# Patient Record
Sex: MALE | Race: WHITE | NOT HISPANIC OR LATINO | Employment: FULL TIME | URBAN - NONMETROPOLITAN AREA
[De-identification: names, ages, dates, MRNs, and addresses within clinical notes are randomized per-mention and may not be internally consistent; named-entity substitution may affect disease eponyms.]

---

## 2017-01-17 ENCOUNTER — OFFICE VISIT (OUTPATIENT)
Dept: PAIN MEDICINE | Facility: CLINIC | Age: 57
End: 2017-01-17

## 2017-01-17 VITALS
SYSTOLIC BLOOD PRESSURE: 130 MMHG | HEIGHT: 68 IN | WEIGHT: 129.9 LBS | BODY MASS INDEX: 19.69 KG/M2 | DIASTOLIC BLOOD PRESSURE: 78 MMHG

## 2017-01-17 DIAGNOSIS — G89.29 CHRONIC LOW BACK PAIN WITH SCIATICA, SCIATICA LATERALITY UNSPECIFIED, UNSPECIFIED BACK PAIN LATERALITY: Primary | ICD-10-CM

## 2017-01-17 DIAGNOSIS — F17.200 SMOKING: ICD-10-CM

## 2017-01-17 DIAGNOSIS — M54.40 CHRONIC LOW BACK PAIN WITH SCIATICA, SCIATICA LATERALITY UNSPECIFIED, UNSPECIFIED BACK PAIN LATERALITY: Primary | ICD-10-CM

## 2017-01-17 DIAGNOSIS — M79.18 MYOFACIAL MUSCLE PAIN: ICD-10-CM

## 2017-01-17 DIAGNOSIS — Z79.891 LONG TERM (CURRENT) USE OF OPIATE ANALGESIC: ICD-10-CM

## 2017-01-17 PROCEDURE — 99204 OFFICE O/P NEW MOD 45 MIN: CPT | Performed by: NURSE PRACTITIONER

## 2017-01-17 RX ORDER — CARISOPRODOL 350 MG/1
350 TABLET ORAL 4 TIMES DAILY PRN
COMMUNITY
End: 2017-03-15 | Stop reason: SDUPTHER

## 2017-01-17 NOTE — MR AVS SNAPSHOT
Nancy Mcclure Sample   2017 8:00 AM   Office Visit    Dept Phone:  [phone]   Encounter #:  [id number]    Provider:  RAHEEM Callahan   Department:  [facility] MEDICAL [facility] PAIN MANAGEMENT                Your Full Care Plan              Your Updated Medication List          This list is accurate as of: 17  9:39 AM.  Always use your most recent med list.                CENTRUM SILVER ADULT 50+ PO       NARCOF PO       SOMA 350 MG tablet   Generic drug:  carisoprodol               We Performed the Following     Ambulatory Referral to Physical Therapy Evaluate and treat (low back pain with bilateral anterior thigh  pain)     XR Spine Lumbar 4+ View       You Were Diagnosed With        Codes Comments    Chronic low back pain with sciatica, sciatica laterality unspecified, unspecified back pain laterality    -  Primary ICD-10-CM: M54.40, G89.29  ICD-9-CM: 724.2, 724.3, 338.29     Myofacial muscle pain     ICD-10-CM: M79.1  ICD-9-CM: 729.1     Long term (current) use of opiate analgesic     ICD-10-CM: Z79.891  ICD-9-CM: V58.69     Smoking     ICD-10-CM: F17.200  ICD-9-CM: 305.1       Instructions     None    Patient Instructions History      Upcoming Appointments     Visit Type Date Time Department    NEW PATIENT 2017  8:00 AM [facility]    FOLLOW UP 3/15/2017  3:00 PM [facility]      go2 [employer] Signup     [facility] [employer] allows you to send messages to your doctor, view your test results, renew your prescriptions, schedule appointments, and more. To sign up, go to [employer] and click on the Sign Up Now link in the New User? box. Enter your QM Power Activation Code exactly as it appears below along with the last four digits of your Social Security Number and your Date of Birth () to complete the sign-up process. If you do not sign up before the expiration date, you must request a new code.    QM Power Activation Code:  [email or web address]  Expires: 1/31/2017  9:39 AM    If you have questions, you can email [email or web address] or call [phone] to talk to our MyChart staff. Remember, [email or web address] is NOT to be used for urgent needs. For medical emergencies, dial 911.               Other Info from Your Visit           Your Appointments     Mar 15, 2017  3:00 PM CDT   Follow Up with Ulysses Mesa MD   [facility] PAIN MANAGEMENT (--)    [address]  [facility] [address] [place] [id number] [phone]           Arrive 15 minutes prior to appointment.              Allergies     No Known Allergies      Reason for Visit     Back Pain lower      Vital Signs     Blood Pressure Height Weight Body Mass Index Smoking Status       130/78 (BP Location: Left arm, Patient Position: Sitting, Cuff Size: Adult) 68\" (172.7 cm) 129 lb 14.4 oz (58.9 kg) 19.75 kg/m2 Heavy Tobacco Smoker       Problems and Diagnoses Noted     Chronic low back pain with sciatica    Admission for long-term opiate analgesic use    Muscle pain    Smoker        "
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
## 2017-01-20 NOTE — PROGRESS NOTES
Addendum:  I have reviewed this patient with RAHEEM Holguin.  I agree with the above findings and plan.  I have personally spoken with the patient today, and confirmed key findings.

## 2017-03-15 ENCOUNTER — APPOINTMENT (OUTPATIENT)
Dept: LAB | Facility: HOSPITAL | Age: 57
End: 2017-03-15

## 2017-03-15 ENCOUNTER — OFFICE VISIT (OUTPATIENT)
Dept: PAIN MEDICINE | Facility: CLINIC | Age: 57
End: 2017-03-15

## 2017-03-15 VITALS
DIASTOLIC BLOOD PRESSURE: 78 MMHG | SYSTOLIC BLOOD PRESSURE: 140 MMHG | HEIGHT: 68 IN | WEIGHT: 131.9 LBS | BODY MASS INDEX: 19.99 KG/M2

## 2017-03-15 DIAGNOSIS — G89.29 CHRONIC BILATERAL LOW BACK PAIN, WITH SCIATICA PRESENCE UNSPECIFIED: Primary | ICD-10-CM

## 2017-03-15 DIAGNOSIS — M79.18 MYOFASCIAL PAIN: ICD-10-CM

## 2017-03-15 DIAGNOSIS — M54.5 CHRONIC BILATERAL LOW BACK PAIN, WITH SCIATICA PRESENCE UNSPECIFIED: Primary | ICD-10-CM

## 2017-03-15 DIAGNOSIS — Z79.899 HIGH RISK MEDICATIONS (NOT ANTICOAGULANTS) LONG-TERM USE: ICD-10-CM

## 2017-03-15 PROCEDURE — 99214 OFFICE O/P EST MOD 30 MIN: CPT | Performed by: PAIN MEDICINE

## 2017-03-15 PROCEDURE — G0481 DRUG TEST DEF 8-14 CLASSES: HCPCS | Performed by: PAIN MEDICINE

## 2017-03-15 PROCEDURE — 80307 DRUG TEST PRSMV CHEM ANLYZR: CPT | Performed by: PAIN MEDICINE

## 2017-03-15 RX ORDER — HYDROCODONE BITARTRATE AND ACETAMINOPHEN 10; 325 MG/1; MG/1
1 TABLET ORAL 4 TIMES DAILY
Qty: 120 TABLET | Refills: 0 | Status: SHIPPED | OUTPATIENT
Start: 2017-03-15 | End: 2017-04-14

## 2017-03-15 RX ORDER — CARISOPRODOL 350 MG/1
350 TABLET ORAL 4 TIMES DAILY
COMMUNITY

## 2017-03-15 RX ORDER — CARISOPRODOL 350 MG/1
350 TABLET ORAL 2 TIMES DAILY
Qty: 60 TABLET | Refills: 1 | Status: SHIPPED | OUTPATIENT
Start: 2017-03-15

## 2017-03-15 RX ORDER — HYDROCODONE BITARTRATE AND ACETAMINOPHEN 10; 325 MG/1; MG/1
TABLET ORAL
COMMUNITY
Start: 2016-03-01

## 2017-03-15 NOTE — PROGRESS NOTES
Nancy Mcclure Sample is a 56 y.o. male.   1960    HPI:   Location: lower back  Quality: aching, throbbing and radiates down both legs  Severity: 3/10  Timing: constant  Alleviating: pain medication  Aggravating: ambulating and increased activity     Patient reports that the opioid medication still provides him good relief and allows increased activity than he would have without the opioid medication. He denies side effects.  X-ray of l-spine showed degen changes.         The following portions of the patient's history were reviewed by me and updated as appropriate: allergies, current medications, past family history, past medical history, past social history, past surgical history and problem list.    Past Medical History   Diagnosis Date   • Corneal FB (foreign body)      improved   • Hearing loss      for 30 years problems hearing high pitche dsounds.   • Measles    • Pneumonia      2016 dec       Social History     Social History   • Marital status:      Spouse name: N/A   • Number of children: N/A   • Years of education: N/A     Occupational History   • Not on file.     Social History Main Topics   • Smoking status: Heavy Tobacco Smoker     Packs/day: 1.00     Types: Cigarettes   • Smokeless tobacco: Not on file      Comment: Smoking Cessation discussed   • Alcohol use Yes      Comment: ocassinally   • Drug use: No   • Sexual activity: Defer     Other Topics Concern   • Not on file     Social History Narrative       Family History   Problem Relation Age of Onset   • Cancer Mother    • Cancer Father          Current Outpatient Prescriptions:   •  carisoprodol (SOMA) 350 MG tablet, Take 1 tablet by mouth 2 (Two) Times a Day., Disp: 60 tablet, Rfl: 1  •  HYDROcodone-acetaminophen (NORCO)  MG per tablet, , Disp: , Rfl:   •  Multiple Vitamins-Minerals (CENTRUM SILVER ADULT 50+ PO), Take  by mouth., Disp: , Rfl:   •  carisoprodol (SOMA) 350 MG tablet, Take 350 mg by mouth 4 (Four) Times a Day., Disp:  , Rfl:   •  HYDROcodone-acetaminophen (NORCO)  MG per tablet, Take 1 tablet by mouth 4 (Four) Times a Day for 30 days., Disp: 120 tablet, Rfl: 0  •  HYDROcodone-acetaminophen (NORCO)  MG per tablet, Take 1 tablet by mouth 4 (Four) Times a Day for 30 days., Disp: 120 tablet, Rfl: 0  •  HYDROcodone-GuaiFENesin (NARCOF PO), Take  by mouth., Disp: , Rfl:   •  VENTOLIN  (90 BASE) MCG/ACT inhaler, , Disp: , Rfl: 0    No Known Allergies      Review of Systems   Musculoskeletal: Positive for back pain (lower).     10 system review of systems was reviewed and negative except for above.    Physical Exam   Constitutional: He appears well-developed and well-nourished. No distress.   Musculoskeletal:        Lumbar back: He exhibits decreased range of motion (60 deg flexion ext 10 deg with facet loading on right).   Neurological: He is alert.   Psychiatric: He has a normal mood and affect. His behavior is normal. Judgment normal.       Nancy was seen today for back pain.    Diagnoses and all orders for this visit:    Chronic bilateral low back pain, with sciatica presence unspecified  -     ToxASSURE Select 13 (MW)    Myofascial pain  -     ToxASSURE Select 13 (MW)    High risk medications (not anticoagulants) long-term use  -     ToxASSURE Select 13 (MW)    Other orders  -     carisoprodol (SOMA) 350 MG tablet; Take 1 tablet by mouth 2 (Two) Times a Day.  -     HYDROcodone-acetaminophen (NORCO)  MG per tablet; Take 1 tablet by mouth 4 (Four) Times a Day for 30 days.  -     HYDROcodone-acetaminophen (NORCO)  MG per tablet; Take 1 tablet by mouth 4 (Four) Times a Day for 30 days.        Medication: Patient reports no negative side effects, Patient reports appropriate usage and storage habits, Patient's opioid provides enough reflief to be more active and perform activities of daily living with less discomfort. and Refill opioid medication as above.  Refill soma as well.    Interventional: none at  this time    Rehab: No call from PT yet. We will try to get this scheduled.    Behavioral: No aberrant behavior noted. SARAH Report #[id number]  was reviewed and is consistent with stated history    Urine drug screen Ordered today to test for drugs of abuse and prescribed medications          This document has been electronically signed by Ulysses Mesa MD on March 15, 2017 3:57 PM

## 2017-03-23 LAB
CONV REPORT SUMMARY: NORMAL
Lab: NORMAL

## 2017-04-10 DIAGNOSIS — G89.29 CHRONIC LOW BACK PAIN WITH SCIATICA, SCIATICA LATERALITY UNSPECIFIED, UNSPECIFIED BACK PAIN LATERALITY: Primary | ICD-10-CM

## 2017-04-10 DIAGNOSIS — M54.40 CHRONIC LOW BACK PAIN WITH SCIATICA, SCIATICA LATERALITY UNSPECIFIED, UNSPECIFIED BACK PAIN LATERALITY: Primary | ICD-10-CM

## 2017-04-19 ENCOUNTER — HOSPITAL ENCOUNTER (OUTPATIENT)
Dept: PHYSICAL THERAPY | Facility: HOSPITAL | Age: 57
Setting detail: THERAPIES SERIES
Discharge: HOME | End: 2017-04-19

## 2017-04-19 DIAGNOSIS — G89.29 CHRONIC LOW BACK PAIN WITH SCIATICA, SCIATICA LATERALITY UNSPECIFIED, UNSPECIFIED BACK PAIN LATERALITY: Primary | ICD-10-CM

## 2017-04-19 DIAGNOSIS — M54.40 CHRONIC LOW BACK PAIN WITH SCIATICA, SCIATICA LATERALITY UNSPECIFIED, UNSPECIFIED BACK PAIN LATERALITY: Primary | ICD-10-CM

## 2017-04-19 PROCEDURE — 97161 PT EVAL LOW COMPLEX 20 MIN: CPT | Performed by: PHYSICAL THERAPIST

## 2017-04-19 PROCEDURE — 97110 THERAPEUTIC EXERCISES: CPT | Performed by: PHYSICAL THERAPIST

## 2017-04-19 NOTE — PROGRESS NOTES
Outpatient Physical Therapy Ortho Initial Evaluation  [facility]     Patient Name: Nancy Mcclure Sample  : 1960  MRN: [id number]  Today's Date: 2017      Visit Date: 2017   Visit 1 of 1  Return to MD: LORETTA  Re-certification date: 5/10/2017  % improvement: N/A     Patient Active Problem List   Diagnosis   • Chronic low back pain with sciatica   • Myofacial muscle pain   • Long term (current) use of opiate analgesic   • Smoking        Past Medical History:   Diagnosis Date   • Corneal FB (foreign body)     improved   • Hearing loss     for 30 years problems hearing high pitche dsounds.   • Measles    • Pneumonia     2016 dec        Past Surgical History:   Procedure Laterality Date   • EYE SURGERY  04/15/2016    remove foreign body cornea with slit lamp   • VASECTOMY             Visit Dx:     ICD-10-CM ICD-9-CM   1. Chronic low back pain with sciatica, sciatica laterality unspecified, unspecified back pain laterality M54.40 724.2    G89.29 724.3     338.29                 PT Ortho       17 1600    Subjective Comments    Subjective Comments 55 yo male with 10 yr h/o chronic low back pain, 2-3 yr h/o bilateral B LE radicular pain described as shooting pain along B posterior thighs traveling distally to the posterior knee region, no h/o trauma or injury to the low back, no falls, patient reports had a lumbar MRI 3 years ago showing evidence of a lumbar disc bulge with nerve root impingement of the S1 nerve root, steadily pt c/o worsening low back pain    -BS    Precautions and Contraindications    Precautions/Limitations no known precautions/limitations  -BS    Subjective Pain    Able to rate subjective pain? yes  -BS    Pre-Treatment Pain Level 3  -BS    Posture/Observations    Posture/Observations Comments kyphotic thoracic spine, absent lumbar lordosis, forward head posture  -BS    Sensation    Additional Comments paresthesia with right anterior lower leg  -BS    Quarter Clearing     Quarter Clearing Lower Quarter Clearing  -BS    Neural Tension Signs- Lower Quarter Clearing    Slump Bilateral:;Negative  -BS    SLR Left:;Positive;Right:;Negative  -BS    Sensory Screen for Light Touch- Lower Quarter Clearing    L1 (inguinal area) Bilateral:;Intact  -BS    L2 (anterior mid thigh) Bilateral:;Intact  -BS    L3 (distal anterior thigh) Bilateral:;Intact  -BS    L4 (medial lower leg/foot) Right:;Diminished  -BS    L5 (lateral lower leg/great toe) Bilateral:;Intact  -BS    Myotomal Screen- Lower Quarter Clearing    Hip flexion (L2) 5 (Normal);Bilateral:  -BS    Knee extension (L3) Right:;4+ (Good +)  -BS    Ankle DF (L4) Bilateral:;5 (Normal)  -BS    Great toe extension (L5) Bilateral:;5 (Normal)  -BS    Ankle PF (S1) Bilateral:;5 (Normal)  -BS    Knee flexion (S2) Bilateral:;5 (Normal)  -BS    ROM (Range of Motion)    General ROM Detail lumbar spine: flex-50% moderate limit, ext- 50%, sidebending R/L-50%  -BS    MMT (Manual Muscle Testing)    General MMT Assessment Detail TTP along L5 spinous process  -BS    Flexibility    Flexibility Tested? Lower Extremity  -BS    Lower Extremity Flexibility    Hamstrings --   R 50 deg, L 55 deg   -BS    Balance Skills Training    Sitting # of Minutes 30 minutes  -BS    Standing Balance # of Minutes 15 minutes  -BS    Gait Assessment/Treatment    Gait, Comment normal gait mechanics with non-antalgic gait   -BS      User Key  (r) = Recorded By, (t) = Taken By, (c) = Cosigned By    Initials Name Provider Type    GENE Walsh, TERRI Physical Therapist                            Therapy Education       04/19/17 1605          Therapy Education    Given HEP;Posture/body mechanics  -BS      Program New  -BS      How Provided Verbal;Written;Demonstration  -BS      Provided to Patient  -BS      Level of Understanding Verbalized;Demonstrated  -BS        User Key  (r) = Recorded By, (t) = Taken By, (c) = Cosigned By    Initials Name Provider Type    GENE Walsh, PT  Physical Therapist                PT OP Goals       04/19/17 1605       PT Short Term Goals    STG Date to Achieve 05/03/17  -BS     STG 1 Patient I with HEP  -BS     STG 1 Progress New  -BS     STG 2 Improve lumbar spine AROM to minimal limit (all planes)  -BS     STG 2 Progress New  -BS     STG 3 Improve R L3 myotome strength (quads) to 5/5 level  -BS     STG 3 Progress New  -BS     STG 4 Improve Modified Oswestry score to < or = 15/50   -BS     Long Term Goals    LTG 1 Improve lumbar spine AROM to WNL (all planes)  -BS     LTG 1 Progress New  -BS     LTG 2 Improve Modified Oswestry score to < or = 10/50   -BS     LTG 3 Improve sitting tolerance to 1 hour with no increase in baseline lumbar spine pain  -BS     LTG 3 Progress New  -BS     Time Calculation    PT Goal Re-Cert Due Date 05/10/17  -BS       User Key  (r) = Recorded By, (t) = Taken By, (c) = Cosigned By    Initials Name Provider Type    GENE Walsh, PT Physical Therapist                      Exercises       04/19/17 1600          Subjective Comments    Subjective Comments 55 yo male with 10 yr h/o chronic low back pain, 2-3 yr h/o bilateral B LE radicular pain described as shooting pain along B posterior thighs traveling distally to the posterior knee region, no h/o trauma or injury to the low back, no falls, patient reports had a lumbar MRI 3 years ago showing evidence of a lumbar disc bulge with nerve root impingement of the S1 nerve root, steadily pt c/o worsening low back pain    -BS      Subjective Pain    Able to rate subjective pain? yes  -BS      Pre-Treatment Pain Level 3  -BS      Aquatics    Aquatics performed? No  -BS      Exercise 1    Exercise Name 1 pelvic tilts  -BS      Sets 1 1  -BS      Reps 1 15  -BS      Exercise 2    Exercise Name 2 Supine piriformis stretch  -BS      Sets 2 1  -BS      Reps 2 3  -BS      Time (Seconds) 2 15  -BS      Exercise 3    Exercise Name 3 supine HS stretch  -BS      Sets 3 1  -BS      Reps 3 3  -BS       Time (Seconds) 3 15  -BS        User Key  (r) = Recorded By, (t) = Taken By, (c) = Cosigned By    Initials Name Provider Type    GENE Walsh, PT Physical Therapist                              Outcome Measures       04/19/17 1800          Modified Oswestry    Modified Oswestry Score/Comments 20/50-40%  -BS      Functional Assessment    Outcome Measure Options Modifed Owestry  -BS        User Key  (r) = Recorded By, (t) = Taken By, (c) = Cosigned By    Initials Name Provider Type    GENE Walsh, PT Physical Therapist            Time Calculation:   Start Time: 1605  Stop Time: 1707  Time Calculation (min): 62 min     Therapy Charges for Today     Code Description Service Date Service Provider Modifiers Qty    93017065102 HC PT THER PROC EA 15 MIN 4/19/2017 Paxton Walsh, PT GP 1    30984756028 HC PT EVAL LOW COMPLEXITY 3 4/19/2017 Paxton Walsh, PT GP 1          PT G-Codes  Outcome Measure Options: Modifed Owestry         Paxton Walsh, PT  4/19/2017

## 2017-04-24 ENCOUNTER — HOSPITAL ENCOUNTER (OUTPATIENT)
Dept: PHYSICAL THERAPY | Facility: HOSPITAL | Age: 57
Setting detail: THERAPIES SERIES
End: 2017-04-24

## 2017-04-27 NOTE — PROGRESS NOTES
Outpatient Physical Therapy Ortho Initial Evaluation  [facility]     Patient Name: Nancy Mcclure Sample  : 1960  MRN: [id number]  Today's Date: 2017      Visit Date: 2017   Visit 1 of 1  Return to MD: LORETTA  Re-certification date: 5/10/17    Patient Active Problem List   Diagnosis   • Chronic low back pain with sciatica   • Myofacial muscle pain   • Long term (current) use of opiate analgesic   • Smoking        Past Medical History:   Diagnosis Date   • Corneal FB (foreign body)     improved   • Hearing loss     for 30 years problems hearing high pitche dsounds.   • Measles    • Pneumonia     2016 dec        Past Surgical History:   Procedure Laterality Date   • EYE SURGERY  04/15/2016    remove foreign body cornea with slit lamp   • VASECTOMY             Visit Dx:     ICD-10-CM ICD-9-CM   1. Chronic low back pain with sciatica, sciatica laterality unspecified, unspecified back pain laterality M54.40 724.2    G89.29 724.3     338.29        Medications (Patient was discharged on 2017)     Outpatient Medications     carisoprodol (SOMA) 350 MG tablet      carisoprodol (SOMA) 350 MG tablet      HYDROcodone-acetaminophen (NORCO)  MG per tablet      HYDROcodone-GuaiFENesin (NARCOF PO)      Multiple Vitamins-Minerals (CENTRUM SILVER ADULT 50+ PO)      VENTOLIN  (90 BASE) MCG/ACT inhaler                       PT Ortho        17 1600          Subjective Comments     Subjective Comments 55 yo male with 10 yr h/o chronic low back pain, 2-3 yr h/o bilateral B LE radicular pain described as shooting pain along B posterior thighs traveling distally to the posterior knee region, no h/o trauma or injury to the low back, no falls, patient reports had a lumbar MRI 3 years ago showing evidence of a lumbar disc bulge with nerve root impingement of the S1 nerve root, steadily pt c/o worsening low back pain  -BS     Precautions and Contraindications     Precautions/Limitations no known  precautions/limitations  -BS     Subjective Pain     Able to rate subjective pain? yes  -BS     Pre-Treatment Pain Level 3  -BS     Posture/Observations     Posture/Observations Comments kyphotic thoracic spine, absent lumbar lordosis, forward head posture  -BS     Sensation     Additional Comments paresthesia with right anterior lower leg  -BS     Quarter Clearing     Quarter Clearing Lower Quarter Clearing  -BS     Neural Tension Signs- Lower Quarter Clearing     Slump Bilateral:;Negative  -BS     SLR Left:;Positive;Right:;Negative  -BS     Sensory Screen for Light Touch- Lower Quarter Clearing     L1 (inguinal area) Bilateral:;Intact  -BS     L2 (anterior mid thigh) Bilateral:;Intact  -BS     L3 (distal anterior thigh) Bilateral:;Intact  -BS     L4 (medial lower leg/foot) Right:;Diminished  -BS     L5 (lateral lower leg/great toe) Bilateral:;Intact  -BS     Myotomal Screen- Lower Quarter Clearing     Hip flexion (L2) 5 (Normal);Bilateral:  -BS     Knee extension (L3) Right:;4+ (Good +)  -BS     Ankle DF (L4) Bilateral:;5 (Normal)  -BS     Great toe extension (L5) Bilateral:;5 (Normal)  -BS     Ankle PF (S1) Bilateral:;5 (Normal)  -BS     Knee flexion (S2) Bilateral:;5 (Normal)  -BS     ROM (Range of Motion)     General ROM Detail lumbar spine: flex-50% moderate limit, ext- 50%, sidebending R/L-50%  -BS     MMT (Manual Muscle Testing)     General MMT Assessment Detail TTP along L5 spinous process  -BS     Flexibility     Flexibility Tested? Lower Extremity  -BS     Lower Extremity Flexibility     Hamstrings --   R 50 deg, L 55 deg   -BS     Balance Skills Training     Sitting # of Minutes 30 minutes  -BS     Standing Balance # of Minutes 15 minutes  -BS     Gait Assessment/Treatment     Gait, Comment normal gait mechanics with non-antalgic gait  -BS          User Key  (r) = Recorded By, (t) = Taken By, (c) = Cosigned By    Initials Name Provider Type     GENE Walsh, PT Physical Therapist                                       Therapy Education        04/19/17 1605                Therapy Education     Given HEP;Posture/body mechanics  -BS         Program New  -BS         How Provided Verbal;Written;Demonstration  -BS         Provided to Patient  -BS         Level of Understanding Verbalized;Demonstrated  -BS              User Key  (r) = Recorded By, (t) = Taken By, (c) = Cosigned By    Initials Name Provider Type     GENE Walsh, PT Physical Therapist                    PT OP Goals        04/19/17 1605             PT Short Term Goals     STG Date to Achieve 05/03/17  -BS       STG 1 Patient I with HEP  -BS       STG 1 Progress New  -BS       STG 2 Improve lumbar spine AROM to minimal limit (all planes)  -BS       STG 2 Progress New  -BS       STG 3 Improve R L3 myotome strength (quads) to 5/5 level  -BS       STG 3 Progress New  -BS       STG 4 Improve Modified Oswestry score to < or = 15/50  -BS       Long Term Goals     LTG 1 Improve lumbar spine AROM to WNL (all planes)  -BS       LTG 1 Progress New  -BS       LTG 2 Improve Modified Oswestry score to < or = 10/50  -BS       LTG 3 Improve sitting tolerance to 1 hour with no increase in baseline lumbar spine pain  -BS       LTG 3 Progress New  -BS       Time Calculation     PT Goal Re-Cert Due Date 05/10/17  -BS            User Key  (r) = Recorded By, (t) = Taken By, (c) = Cosigned By    Initials Name Provider Type     GENE Walsh, PT Physical Therapist                             Exercises        04/19/17 1600                Subjective Comments     Subjective Comments 57 yo male with 10 yr h/o chronic low back pain, 2-3 yr h/o bilateral B LE radicular pain described as shooting pain along B posterior thighs traveling distally to the posterior knee region, no h/o trauma or injury to the low back, no falls, patient reports had a lumbar MRI 3 years ago showing evidence of a lumbar disc bulge with nerve root impingement of the S1 nerve root,  steadily pt c/o worsening low back pain  -BS         Subjective Pain     Able to rate subjective pain? yes  -BS         Pre-Treatment Pain Level 3  -BS         Aquatics     Aquatics performed? No  -BS         Exercise 1     Exercise Name 1 pelvic tilts  -BS         Sets 1 1  -BS         Reps 1 15  -BS         Exercise 2     Exercise Name 2 Supine piriformis stretch  -BS         Sets 2 1  -BS         Reps 2 3  -BS         Time (Seconds) 2 15  -BS         Exercise 3     Exercise Name 3 supine HS stretch  -BS         Sets 3 1  -BS         Reps 3 3  -BS         Time (Seconds) 3 15  -BS              User Key  (r) = Recorded By, (t) = Taken By, (c) = Cosigned By    Initials Name Provider Type     BS Paxton Walsh, PT Physical Therapist                                      Outcome Measures        04/19/17 1800                Modified Oswestry     Modified Oswestry Score/Comments 20/50-40%  -BS         Functional Assessment     Outcome Measure Options Modifed Owestry  -BS              User Key  (r) = Recorded By, (t) = Taken By, (c) = Cosigned By    Initials Name Provider Type     BS Paxton Walsh, PT Physical Therapist               Time Calculation:   Start Time: 1605  Stop Time: 1707  Time Calculation (min): 62 min      Therapy Charges for Today     Code Description Service Date Service Provider Modifiers Qty     20642442061 HC PT THER PROC EA 15 MIN 4/19/2017 Paxton Walsh, PT GP 1     27581600440 HC PT EVAL LOW COMPLEXITY 3 4/19/2017 Paxton Walsh, PT GP 1                    PT Ortho        04/19/17 1600          Subjective Comments     Subjective Comments 57 yo male with 10 yr h/o chronic low back pain, 2-3 yr h/o bilateral B LE radicular pain described as shooting pain along B posterior thighs traveling distally to the posterior knee region, no h/o trauma or injury to the low back, no falls, patient reports had a lumbar MRI 3 years ago showing evidence of a lumbar disc bulge with nerve root impingement of the S1  nerve root, steadily pt c/o worsening low back pain  -BS     Precautions and Contraindications     Precautions/Limitations no known precautions/limitations  -BS     Subjective Pain     Able to rate subjective pain? yes  -BS     Pre-Treatment Pain Level 3  -BS     Posture/Observations     Posture/Observations Comments kyphotic thoracic spine, absent lumbar lordosis, forward head posture  -BS     Sensation     Additional Comments paresthesia with right anterior lower leg  -BS     Quarter Clearing     Quarter Clearing Lower Quarter Clearing  -BS     Neural Tension Signs- Lower Quarter Clearing     Slump Bilateral:;Negative  -BS     SLR Left:;Positive;Right:;Negative  -BS     Sensory Screen for Light Touch- Lower Quarter Clearing     L1 (inguinal area) Bilateral:;Intact  -BS     L2 (anterior mid thigh) Bilateral:;Intact  -BS     L3 (distal anterior thigh) Bilateral:;Intact  -BS     L4 (medial lower leg/foot) Right:;Diminished  -BS     L5 (lateral lower leg/great toe) Bilateral:;Intact  -BS     Myotomal Screen- Lower Quarter Clearing     Hip flexion (L2) 5 (Normal);Bilateral:  -BS     Knee extension (L3) Right:;4+ (Good +)  -BS     Ankle DF (L4) Bilateral:;5 (Normal)  -BS     Great toe extension (L5) Bilateral:;5 (Normal)  -BS     Ankle PF (S1) Bilateral:;5 (Normal)  -BS     Knee flexion (S2) Bilateral:;5 (Normal)  -BS     ROM (Range of Motion)     General ROM Detail lumbar spine: flex-50% moderate limit, ext- 50%, sidebending R/L-50%  -BS     MMT (Manual Muscle Testing)     General MMT Assessment Detail TTP along L5 spinous process  -BS     Flexibility     Flexibility Tested? Lower Extremity  -BS     Lower Extremity Flexibility     Hamstrings --   R 50 deg, L 55 deg   -BS     Balance Skills Training     Sitting # of Minutes 30 minutes  -BS     Standing Balance # of Minutes 15 minutes  -BS     Gait Assessment/Treatment     Gait, Comment normal gait mechanics with non-antalgic gait  -BS          User Key  (r) = Recorded  By, (t) = Taken By, (c) = Cosigned By    Initials Name Provider Type     GENE Walsh, PT Physical Therapist                                      Therapy Education        04/19/17 1605                Therapy Education     Given HEP;Posture/body mechanics  -BS         Program New  -BS         How Provided Verbal;Written;Demonstration  -BS         Provided to Patient  -BS         Level of Understanding Verbalized;Demonstrated  -BS              User Key  (r) = Recorded By, (t) = Taken By, (c) = Cosigned By    Initials Name Provider Type     GENE Waslh, PT Physical Therapist                    PT OP Goals        04/19/17 1605             PT Short Term Goals     STG Date to Achieve 05/03/17  -BS       STG 1 Patient I with HEP  -BS       STG 1 Progress New  -BS       STG 2 Improve lumbar spine AROM to minimal limit (all planes)  -BS       STG 2 Progress New  -BS       STG 3 Improve R L3 myotome strength (quads) to 5/5 level  -BS       STG 3 Progress New  -BS       STG 4 Improve Modified Oswestry score to < or = 15/50  -BS       Long Term Goals     LTG 1 Improve lumbar spine AROM to WNL (all planes)  -BS       LTG 1 Progress New  -BS       LTG 2 Improve Modified Oswestry score to < or = 10/50  -BS       LTG 3 Improve sitting tolerance to 1 hour with no increase in baseline lumbar spine pain  -BS       LTG 3 Progress New  -BS       Time Calculation     PT Goal Re-Cert Due Date 05/10/17  -BS            User Key  (r) = Recorded By, (t) = Taken By, (c) = Cosigned By    Initials Name Provider Type     GENE Walsh, PT Physical Therapist                             Exercises        04/19/17 1600                Subjective Comments     Subjective Comments 55 yo male with 10 yr h/o chronic low back pain, 2-3 yr h/o bilateral B LE radicular pain described as shooting pain along B posterior thighs traveling distally to the posterior knee region, no h/o trauma or injury to the low back, no falls, patient reports had a  lumbar MRI 3 years ago showing evidence of a lumbar disc bulge with nerve root impingement of the S1 nerve root, steadily pt c/o worsening low back pain  -BS         Subjective Pain     Able to rate subjective pain? yes  -BS         Pre-Treatment Pain Level 3  -BS         Aquatics     Aquatics performed? No  -BS         Exercise 1     Exercise Name 1 pelvic tilts  -BS         Sets 1 1  -BS         Reps 1 15  -BS         Exercise 2     Exercise Name 2 Supine piriformis stretch  -BS         Sets 2 1  -BS         Reps 2 3  -BS         Time (Seconds) 2 15  -BS         Exercise 3     Exercise Name 3 supine HS stretch  -BS         Sets 3 1  -BS         Reps 3 3  -BS         Time (Seconds) 3 15  -BS              User Key  (r) = Recorded By, (t) = Taken By, (c) = Cosigned By    Initials Name Provider Type     GENE Walsh, PT Physical Therapist                                      Outcome Measures        04/19/17 1800                Modified Oswestry     Modified Oswestry Score/Comments 20/50-40%  -BS         Functional Assessment     Outcome Measure Options Modifed Owestry  -BS              User Key  (r) = Recorded By, (t) = Taken By, (c) = Cosigned By    Initials Name Provider Type     GENE Walsh, PT Physical Therapist               Time Calculation:   Start Time: 1605  Stop Time: 1707  Time Calculation (min): 62 min      Therapy Charges for Today     Code Description Service Date Service Provider Modifiers Qty     83897517174 HC PT THER PROC EA 15 MIN 4/19/2017 Paxton Walsh, PT GP 1     85545945587 HC PT EVAL LOW COMPLEXITY 3 4/19/2017 Paxton Walsh, PT GP 1            PT G-Codes  Outcome Measure Options: Modifed Owestry            PT G-Codes  Outcome Measure Options: Modifed Owestry       addendum: note amended on 4/27/17 as a late entry due to omission of assessment and plan when entered originally on 4/19/17. Sent to MD on 4/27/17.                              PT Assessment/Plan       04/27/17 1700       PT  Assessment    Functional Limitations Performance in self-care ADL;Performance in leisure activities  -BS     Impairments Range of motion;Pain;Muscle strength;Poor body mechanics;Posture;Sensation  -BS     Assessment Comments 57 yo male with chronic LBP with evidence of L LE radiculopathy. Presents limited by decreased lumbar ROM, decreased L quad strength, low back pain and adverse neural tissue tension in the L LE.    -BS     Please refer to paper survey for additional self-reported information Yes  -BS     Rehab Potential Good  -BS     Patient/caregiver participated in establishment of treatment plan and goals Yes  -BS     Patient would benefit from skilled therapy intervention Yes  -BS     PT Plan    PT Frequency 2x/week  -BS     Predicted Duration of Therapy Intervention (days/wks) 6 weeks  -BS     Planned CPT's? PT EVAL LOW COMPLEXITY: 49949;PT THER PROC EA 15 MIN: 62974;PT RE-EVAL: 89283;PT MANUAL THERAPY EA 15 MIN: 21781;PT ELECTRICAL STIM UNATTEND: ;PT HOT/COLD PACK WC NONMCARE: 36535;PT THER SUPP EA 15 MIN  -BS     Physical Therapy Interventions (Optional Details) home exercise program;joint mobilization;lumbar stabilization;manual therapy techniques;modalities;patient/family education;postural re-education;ROM (Range of Motion);strengthening;stretching  -BS     PT Plan Comments f/u with core trunk stabilization  -BS       User Key  (r) = Recorded By, (t) = Taken By, (c) = Cosigned By    Initials Name Provider Type    GENE Walsh, PT Physical Therapist                                    Time Calculation:   Start Time: 1605  Stop Time: 1707  Time Calculation (min): 62 min         PT G-Demetra  Outcome Measure Options: Modifed Osman Walsh, PT  4/27/2017

## 2017-05-09 ENCOUNTER — HOSPITAL ENCOUNTER (OUTPATIENT)
Dept: PHYSICAL THERAPY | Facility: HOSPITAL | Age: 57
Setting detail: THERAPIES SERIES
Discharge: HOME | End: 2017-05-09

## 2017-05-09 DIAGNOSIS — M54.40 CHRONIC LOW BACK PAIN WITH SCIATICA, SCIATICA LATERALITY UNSPECIFIED, UNSPECIFIED BACK PAIN LATERALITY: Primary | ICD-10-CM

## 2017-05-09 DIAGNOSIS — G89.29 CHRONIC LOW BACK PAIN WITH SCIATICA, SCIATICA LATERALITY UNSPECIFIED, UNSPECIFIED BACK PAIN LATERALITY: Primary | ICD-10-CM

## 2017-05-09 PROCEDURE — 97110 THERAPEUTIC EXERCISES: CPT | Performed by: PHYSICAL THERAPIST

## 2017-05-16 ENCOUNTER — OFFICE VISIT (OUTPATIENT)
Dept: PAIN MEDICINE | Facility: CLINIC | Age: 57
End: 2017-05-16

## 2017-05-16 VITALS
SYSTOLIC BLOOD PRESSURE: 138 MMHG | BODY MASS INDEX: 19.76 KG/M2 | DIASTOLIC BLOOD PRESSURE: 70 MMHG | HEIGHT: 68 IN | WEIGHT: 130.4 LBS

## 2017-05-16 DIAGNOSIS — Z79.899 HIGH RISK MEDICATIONS (NOT ANTICOAGULANTS) LONG-TERM USE: ICD-10-CM

## 2017-05-16 DIAGNOSIS — G89.29 CHRONIC BILATERAL LOW BACK PAIN WITH SCIATICA, SCIATICA LATERALITY UNSPECIFIED: Primary | ICD-10-CM

## 2017-05-16 DIAGNOSIS — M79.18 MYOFACIAL MUSCLE PAIN: ICD-10-CM

## 2017-05-16 DIAGNOSIS — M54.40 CHRONIC BILATERAL LOW BACK PAIN WITH SCIATICA, SCIATICA LATERALITY UNSPECIFIED: Primary | ICD-10-CM

## 2017-05-16 PROCEDURE — 99213 OFFICE O/P EST LOW 20 MIN: CPT | Performed by: NURSE PRACTITIONER

## 2017-05-16 RX ORDER — CARISOPRODOL 350 MG/1
350 TABLET ORAL 4 TIMES DAILY
COMMUNITY

## 2017-05-16 RX ORDER — ALBUTEROL SULFATE 90 UG/1
2 AEROSOL, METERED RESPIRATORY (INHALATION) EVERY 6 HOURS
COMMUNITY
Start: 2016-12-08 | End: 2017-12-09

## 2017-05-16 RX ORDER — HYDROCODONE BITARTRATE AND ACETAMINOPHEN 10; 325 MG/1; MG/1
TABLET ORAL
COMMUNITY
Start: 2016-03-01

## 2017-05-16 RX ORDER — GUAIFENESIN, PSEUDOEPHEDRINE HYDROCHLORIDE 600; 60 MG/1; MG/1
TABLET, EXTENDED RELEASE ORAL
Refills: 0 | COMMUNITY
Start: 2017-02-15

## 2017-05-18 ENCOUNTER — HOSPITAL ENCOUNTER (OUTPATIENT)
Dept: PHYSICAL THERAPY | Facility: HOSPITAL | Age: 57
Setting detail: THERAPIES SERIES
Discharge: HOME | End: 2017-05-18

## 2017-05-18 DIAGNOSIS — G89.29 CHRONIC LOW BACK PAIN WITH SCIATICA, SCIATICA LATERALITY UNSPECIFIED, UNSPECIFIED BACK PAIN LATERALITY: Primary | ICD-10-CM

## 2017-05-18 DIAGNOSIS — M54.40 CHRONIC LOW BACK PAIN WITH SCIATICA, SCIATICA LATERALITY UNSPECIFIED, UNSPECIFIED BACK PAIN LATERALITY: Primary | ICD-10-CM

## 2017-05-18 PROCEDURE — 97110 THERAPEUTIC EXERCISES: CPT | Performed by: PHYSICAL THERAPIST

## 2017-06-12 ENCOUNTER — HOSPITAL ENCOUNTER (OUTPATIENT)
Dept: PHYSICAL THERAPY | Facility: HOSPITAL | Age: 57
Setting detail: THERAPIES SERIES
End: 2017-06-12

## 2017-06-12 DIAGNOSIS — G89.29 CHRONIC MIDLINE LOW BACK PAIN, WITH SCIATICA PRESENCE UNSPECIFIED: Primary | ICD-10-CM

## 2017-06-12 DIAGNOSIS — M54.5 CHRONIC MIDLINE LOW BACK PAIN, WITH SCIATICA PRESENCE UNSPECIFIED: Primary | ICD-10-CM

## 2017-06-20 ENCOUNTER — HOSPITAL ENCOUNTER (OUTPATIENT)
Dept: PHYSICAL THERAPY | Facility: HOSPITAL | Age: 57
Setting detail: THERAPIES SERIES
Discharge: HOME | End: 2017-06-20

## 2017-06-20 DIAGNOSIS — G89.29 CHRONIC LOW BACK PAIN WITH SCIATICA, SCIATICA LATERALITY UNSPECIFIED, UNSPECIFIED BACK PAIN LATERALITY: Primary | ICD-10-CM

## 2017-06-20 DIAGNOSIS — M54.40 CHRONIC LOW BACK PAIN WITH SCIATICA, SCIATICA LATERALITY UNSPECIFIED, UNSPECIFIED BACK PAIN LATERALITY: Primary | ICD-10-CM

## 2017-06-20 PROCEDURE — 97110 THERAPEUTIC EXERCISES: CPT | Performed by: PHYSICAL THERAPIST

## 2017-06-20 NOTE — THERAPY PROGRESS REPORT/RE-CERT
Outpatient Physical Therapy Ortho Progress Note   [place]     Patient Name: Nancy Mcclure Sample  : 1960  MRN: [id number]  Today's Date: 2017      Visit Date: 2017  Visit /  Re-cert date: 17  Return to MD: ??    Patient Active Problem List   Diagnosis   • Chronic low back pain with sciatica   • Myofacial muscle pain   • Long term (current) use of opiate analgesic   • Smoking   • High risk medications (not anticoagulants) long-term use        Past Medical History:   Diagnosis Date   • Corneal FB (foreign body)     improved   • Hearing loss     for 30 years problems hearing high pitche dsounds.   • Measles    • Pneumonia     2016 dec        Past Surgical History:   Procedure Laterality Date   • EYE SURGERY  04/15/2016    remove foreign body cornea with slit lamp   • VASECTOMY             Visit Dx:     ICD-10-CM ICD-9-CM   1. Chronic low back pain with sciatica, sciatica laterality unspecified, unspecified back pain laterality M54.40 724.2    G89.29 724.3     338.29                 PT Ortho       17 1600    Subjective Comments    Subjective Comments pt reports not able to attend therapy for the past month due to work conflicts, pt states the clinic cancelled his last session (Sports medicine)  -BS    Precautions and Contraindications    Precautions/Limitations no known precautions/limitations  -BS    Subjective Pain    Able to rate subjective pain? yes  -BS    Pre-Treatment Pain Level 3  -BS    Sensation    Additional Comments TTP along L5 spinous process and right transverse process  -BS    Quarter Clearing    Quarter Clearing Lower Quarter Clearing  -BS    Neural Tension Signs- Lower Quarter Clearing    SLR Right:;Positive;Left:;Negative  -BS    Sensory Screen for Light Touch- Lower Quarter Clearing    L1 (inguinal area) Bilateral:;Intact  -BS    L2 (anterior mid thigh) Bilateral:;Intact  -BS    L3 (distal anterior thigh) Bilateral:;Intact  -BS    L4 (medial lower  leg/foot) Bilateral:;Intact  -BS    L5 (lateral lower leg/great toe) Right:;Diminished;Left:;Intact  -BS    S1 (bottom of foot) Bilateral:;Intact  -BS    Myotomal Screen- Lower Quarter Clearing    Hip flexion (L2) Bilateral:;5 (Normal)  -BS    Knee extension (L3) Bilateral:;4 (Good)  -BS    Ankle DF (L4) Bilateral:;5 (Normal)  -BS    Great toe extension (L5) Bilateral:;5 (Normal)  -BS    Ankle PF (S1) Bilateral:;5 (Normal)  -BS    Knee flexion (S2) Bilateral:;5 (Normal)  -BS    Lumbar ROM Screen- Lower Quarter Clearing    Lumbar Flexion Impaired   minimal limit 75%  -BS    Lumbar Extension Impaired   moderate limit 50%  -BS    Lumbar Lateral Flexion Normal   bilaterally  -BS    Lumbar Rotation Impaired   severe limit R 25%, minimal limit 75% left  -BS      User Key  (r) = Recorded By, (t) = Taken By, (c) = Cosigned By    Initials Name Provider Type    GENE Walsh PT Physical Therapist                            Therapy Education       06/20/17 1600          Therapy Education    Given HEP  -BS      Program Reinforced  -BS      How Provided Verbal  -BS      Provided to Patient  -BS      Level of Understanding Verbalized;Demonstrated  -BS        User Key  (r) = Recorded By, (t) = Taken By, (c) = Cosigned By    Initials Name Provider Type    GENE Walsh PT Physical Therapist                PT OP Goals       06/20/17 1600       PT Short Term Goals    STG Date to Achieve 07/04/17  -BS     STG 1 Patient I with HEP  -BS     STG 1 Progress Ongoing  -BS     STG 2 Improve lumbar spine AROM to minimal limit (all planes)  -BS     STG 2 Progress Progressing  -BS     STG 3 Improve R L3 myotome strength (quads) to 5/5 level  -BS     STG 3 Progress Ongoing  -BS     STG 4 Improve Modified Oswestry score to < or = 15/50   -BS     STG 4 Progress Ongoing  -BS     Long Term Goals    LTG Date to Achieve 07/18/17  -BS     LTG 1 Improve lumbar spine AROM to WNL (all planes)  -BS     LTG 1 Progress Ongoing  -BS     LTG 2 Improve  Modified Oswestry score to < or = 10/50   -BS     LTG 2 Progress Ongoing  -BS     LTG 3 Improve sitting tolerance to 1 hour with no increase in baseline lumbar spine pain  -BS     LTG 3 Progress Ongoing  -BS     Time Calculation    PT Goal Re-Cert Due Date 07/11/17  -BS       User Key  (r) = Recorded By, (t) = Taken By, (c) = Cosigned By    Initials Name Provider Type    BS Paxton Walsh, PT Physical Therapist                PT Assessment/Plan       06/20/17 1700       PT Assessment    Functional Limitations Performance in self-care ADL;Performance in leisure activities  -BS     Impairments Range of motion;Pain;Muscle strength;Poor body mechanics;Posture;Sensation  -BS     Assessment Comments pt limited by core trunk instability and evidence of slight R LE radiculopathy upon reassessment today (+ SLR on R only). Pt instructed to commit to cominig to PT at least once a week to get any positive outcomes from PT  -BS     Please refer to paper survey for additional self-reported information Yes  -BS     Rehab Potential Good  -BS     Patient/caregiver participated in establishment of treatment plan and goals Yes  -BS     Patient would benefit from skilled therapy intervention Yes  -BS     PT Plan    PT Frequency 1x/week  -BS     Predicted Duration of Therapy Intervention (days/wks) 4 weeks  -BS     Planned CPT's? PT RE-EVAL: 21059;PT THER PROC EA 15 MIN: 15310;PT THER ACT EA 15 MIN: 49194;PT MANUAL THERAPY EA 15 MIN: 01788;PT ELECTRICAL STIM UNATTEND: ;PT ULTRASOUND EA 15 MIN: 78224;PT TRACTION LUMBAR: 53324;PT NEUROMUSC RE-EDUCATION EA 15 MIN: 16089;PT HOT/COLD PACK WC NONMCARE: 91691;PT THER SUPP EA 15 MIN  -BS     Physical Therapy Interventions (Optional Details) home exercise program;joint mobilization;lumbar stabilization;manual therapy techniques;modalities;neuromuscular re-education;patient/family education;postural re-education;ROM (Range of Motion);strengthening;stretching  -BS     PT Plan Comments focus on  "core trunk stability, revisit effect of extension based lumbar ROM ex's with follow up session  -BS       User Key  (r) = Recorded By, (t) = Taken By, (c) = Cosigned By    Initials Name Provider Type    GENE Walsh PT Physical Therapist                  Exercises       06/20/17 1600          Subjective Comments    Subjective Comments pt reports not able to attend therapy for the past month due to work conflicts, pt states the clinic cancelled his last session (Sports medicine)  -BS      Subjective Pain    Able to rate subjective pain? yes  -BS      Pre-Treatment Pain Level 3  -BS      Post-Treatment Pain Level 4  -BS      Aquatics    Aquatics performed? No  -BS      Exercise 1    Exercise Name 1 prone lying   -BS      Exercise 2    Exercise Name 2 prone on elbows   -BS      Exercise 3    Exercise Name 3 supine HS stretch  -BS      Sets 3 1  -BS      Reps 3 2  -BS      Time (Seconds) 3 30\" hold  -BS      Exercise 4    Exercise Name 4 B SLR  -BS      Sets 4 1  -BS      Reps 4 10  -BS      Exercise 5    Exercise Name 5 bridge with B iso hip add  -BS      Sets 5 1  -BS      Reps 5 10  -BS        User Key  (r) = Recorded By, (t) = Taken By, (c) = Cosigned By    Initials Name Provider Type    GENE Walsh PT Physical Therapist                              Outcome Measures       06/20/17 1600          Modified Oswestry    Modified Oswestry Score/Comments 22/50-44%  -BS      Functional Assessment    Outcome Measure Options Modifed Owestry  -BS        User Key  (r) = Recorded By, (t) = Taken By, (c) = Cosigned By    Initials Name Provider Type    GENE Walsh PT Physical Therapist            Time Calculation:   Start Time: 1617  Stop Time: 1656  Time Calculation (min): 39 min     Therapy Charges for Today     Code Description Service Date Service Provider Modifiers Qty    19185265929  PT THER PROC EA 15 MIN 6/20/2017 Paxton Walsh, PT GP 3          PT G-Codes  Outcome Measure Options: Modifed Owestry "         Paxton Walsh, PT  6/20/2017

## 2017-06-28 ENCOUNTER — HOSPITAL ENCOUNTER (OUTPATIENT)
Dept: PHYSICAL THERAPY | Facility: HOSPITAL | Age: 57
Setting detail: THERAPIES SERIES
Discharge: HOME | End: 2017-06-28

## 2017-06-28 DIAGNOSIS — M54.40 CHRONIC LOW BACK PAIN WITH SCIATICA, SCIATICA LATERALITY UNSPECIFIED, UNSPECIFIED BACK PAIN LATERALITY: Primary | ICD-10-CM

## 2017-06-28 DIAGNOSIS — G89.29 CHRONIC LOW BACK PAIN WITH SCIATICA, SCIATICA LATERALITY UNSPECIFIED, UNSPECIFIED BACK PAIN LATERALITY: Primary | ICD-10-CM

## 2017-06-28 PROCEDURE — 97110 THERAPEUTIC EXERCISES: CPT | Performed by: PHYSICAL THERAPIST

## 2017-06-28 PROCEDURE — 97140 MANUAL THERAPY 1/> REGIONS: CPT | Performed by: PHYSICAL THERAPIST

## 2017-06-28 NOTE — THERAPY TREATMENT NOTE
Outpatient Physical Therapy Ortho Treatment Note  [facility]     Patient Name: Nancy Mcclure Sample  : 1960  MRN: [id number]  Today's Date: 2017      Visit Date: 2017        Visit   Recert date 17  MD visit 17  'little' improvement            Visit Dx:    ICD-10-CM ICD-9-CM   1. Chronic low back pain with sciatica, sciatica laterality unspecified, unspecified back pain laterality M54.40 724.2    G89.29 724.3     338.29       Patient Active Problem List   Diagnosis   • Chronic low back pain with sciatica   • Myofacial muscle pain   • Long term (current) use of opiate analgesic   • Smoking   • High risk medications (not anticoagulants) long-term use        Past Medical History:   Diagnosis Date   • Corneal FB (foreign body)     improved   • Hearing loss     for 30 years problems hearing high pitche dsounds.   • Measles    • Pneumonia     2016 dec        Past Surgical History:   Procedure Laterality Date   • EYE SURGERY  04/15/2016    remove foreign body cornea with slit lamp   • VASECTOMY                                   PT Assessment/Plan       17 17017 1644    PT Assessment    Assessment Comments difficulty w/ sit to stands  -KW point tenderness to right sided lumbar spine in paraspinals  -KW      17 1614       PT Assessment    Assessment Comments + SLR on R LE  -KW     PT Plan    PT Plan Comments focus on more core trunk stability, try more extention exercises  -KW       User Key  (r) = Recorded By, (t) = Taken By, (c) = Cosigned By    Initials Name Provider Type    USMAN Cassidy, PT Physical Therapist                Modalities       17 1600          Ice    Ice Applied Yes  -KW      Location lumbar spine  -KW      Rx Minutes 10 mins  -KW      Ice S/P Rx Yes  -KW        User Key  (r) = Recorded By, (t) = Taken By, (c) = Cosigned By    Initials Name Provider Type    USMAN Cassidy, PT Physical Therapist                Exercises        06/28/17 1600          Subjective Comments    Subjective Comments Reports lots of overtime causing increased LBP  -KW      Subjective Pain    Pre-Treatment Pain Level 2  -KW      Post-Treatment Pain Level 1  -KW      Exercise 1    Exercise Name 1 bridges  -KW      Sets 1 2  -KW      Reps 1 10  -KW      Exercise 2    Exercise Name 2 bridges w/ SLR  -KW      Sets 2 2  -KW      Reps 2 10  -KW      Exercise 3    Exercise Name 3 stretch HS  -KW      Sets 3 3  -KW      Time (Seconds) 3 30  -KW      Exercise 4    Exercise Name 4 prone ext  -KW      Sets 4 3  -KW      Reps 4 10  -KW        User Key  (r) = Recorded By, (t) = Taken By, (c) = Cosigned By    Initials Name Provider Type    KW Kim Cassidy, PT Physical Therapist                        Manual Rx (last 36 hours)      Manual Treatments       06/28/17 1500          Manual Rx 1    Manual Rx 1 Location Lumbar spine   -KW      Manual Rx 1 Type PA glided  -KW      Manual Rx 1 Grade grade 2  -KW      Manual Rx 1 Duration 30x  -KW      Manual Rx 2    Manual Rx 2 Location cupping to lumbar spine  -KW      Manual Rx 2 Duration 8  -KW        User Key  (r) = Recorded By, (t) = Taken By, (c) = Cosigned By    Initials Name Provider Type    KW Kim Cassidy, PT Physical Therapist                PT OP Goals       06/28/17 1600       PT Short Term Goals    STG Date to Achieve 07/04/17  -KW     STG 1 Patient I with HEP  -KW     STG 1 Progress Ongoing  -KW     STG 2 Improve lumbar spine AROM to minimal limit (all planes)  -KW     STG 2 Progress Progressing  -KW     STG 3 Improve R L3 myotome strength (quads) to 5/5 level  -KW     STG 3 Progress Ongoing  -KW     STG 4 Improve Modified Oswestry score to < or = 15/50   -KW     STG 4 Progress Ongoing  -KW     Long Term Goals    LTG Date to Achieve 07/18/17  -KW     LTG 1 Improve lumbar spine AROM to WNL (all planes)  -KW     LTG 1 Progress Ongoing  -KW     LTG 2 Improve Modified Oswestry score to < or = 10/50   -KW     LTG 2  Progress Ongoing  -KW     LTG 3 Improve sitting tolerance to 1 hour with no increase in baseline lumbar spine pain  -KW     LTG 3 Progress Ongoing  -KW     Time Calculation    PT Goal Re-Cert Due Date 06/11/17  -KW       User Key  (r) = Recorded By, (t) = Taken By, (c) = Cosigned By    Initials Name Provider Type    USMAN Cassidy, PT Physical Therapist                Therapy Education       06/28/17 1613          Therapy Education    Given HEP  -KW      Program Reinforced  -KW      How Provided Verbal  -KW      Provided to Patient  -KW      Level of Understanding Verbalized  -KW        User Key  (r) = Recorded By, (t) = Taken By, (c) = Cosigned By    Initials Name Provider Type    USMAN Cassidy, PT Physical Therapist                Time Calculation:   Start Time: 1602  Stop Time: 1659  Time Calculation (min): 57 min  Total Timed Code Minutes- PT: 57 minute(s)    Therapy Charges for Today     Code Description Service Date Service Provider Modifiers Qty    99236234480 HC PT THER PROC EA 15 MIN 6/28/2017 Kim Cassidy, PT GP 3    63243176017 HC PT MANUAL THERAPY EA 15 MIN 6/28/2017 Kim Cassidy, PT GP 1                    Kim Cassidy, PT  6/28/2017

## 2017-07-05 ENCOUNTER — HOSPITAL ENCOUNTER (OUTPATIENT)
Dept: PHYSICAL THERAPY | Facility: HOSPITAL | Age: 57
Setting detail: THERAPIES SERIES
Discharge: HOME | End: 2017-07-05

## 2017-07-05 DIAGNOSIS — G89.29 CHRONIC LOW BACK PAIN WITH SCIATICA, SCIATICA LATERALITY UNSPECIFIED, UNSPECIFIED BACK PAIN LATERALITY: Primary | ICD-10-CM

## 2017-07-05 DIAGNOSIS — M54.40 CHRONIC LOW BACK PAIN WITH SCIATICA, SCIATICA LATERALITY UNSPECIFIED, UNSPECIFIED BACK PAIN LATERALITY: Primary | ICD-10-CM

## 2017-07-05 PROCEDURE — 97110 THERAPEUTIC EXERCISES: CPT

## 2017-07-05 NOTE — THERAPY TREATMENT NOTE
Outpatient Physical Therapy Ortho Treatment Note  [facility]     Patient Name: Nancy Mcclure Sample  : 1960  MRN: [id number]  Today's Date: 2017      Visit Date: 2017    Subjective Improvement:    Visits Attended:    Visits Approved    MD visit:    Recert Date: 17       Visit Dx:    ICD-10-CM ICD-9-CM   1. Chronic low back pain with sciatica, sciatica laterality unspecified, unspecified back pain laterality M54.40 724.2    G89.29 724.3     338.29       Patient Active Problem List   Diagnosis   • Chronic low back pain with sciatica   • Myofacial muscle pain   • Long term (current) use of opiate analgesic   • Smoking   • High risk medications (not anticoagulants) long-term use        Past Medical History:   Diagnosis Date   • Corneal FB (foreign body)     improved   • Hearing loss     for 30 years problems hearing high pitche dsounds.   • Measles    • Pneumonia     2016 dec        Past Surgical History:   Procedure Laterality Date   • EYE SURGERY  04/15/2016    remove foreign body cornea with slit lamp   • VASECTOMY                                   PT Assessment/Plan       17 1555       PT Assessment    Assessment Comments Pt cont to display stiffness with t/f's during tx.   -TW     PT Plan    PT Plan Comments Cont with core and trunk strengthening.  -TW       User Key  (r) = Recorded By, (t) = Taken By, (c) = Cosigned By    Initials Name Provider Type    DARVIN Hankins PTA Physical Therapy Assistant                Modalities       17 1555          Subjective Pain    Post-Treatment Pain Level 1  -TW      Ice    Ice Applied Yes  -TW      Location lumbar spine  -TW      Rx Minutes 10 mins  -TW      Ice S/P Rx Yes  -TW        User Key  (r) = Recorded By, (t) = Taken By, (c) = Cosigned By    Initials Name Provider Type    DARVIN Hankins PTA Physical Therapy Assistant                Exercises       17 1555          Subjective Comments    Subjective  Comments Pt reports doing ex's 1x daily.  Pain at 2-3/10 right now.  -TW      Subjective Pain    Pre-Treatment Pain Level 2  -TW      Post-Treatment Pain Level 1  -TW      Exercise 1    Exercise Name 1 bridges  -TW      Sets 1 2  -TW      Reps 1 15  -TW      Exercise 2    Exercise Name 2 bridges w/ SLR  -TW      Sets 2 2  -TW      Reps 2 15  -TW      Exercise 3    Exercise Name 3 supine HS stretch with towel  -TW      Sets 3 3  -TW      Time (Seconds) 3 30  -TW      Exercise 4    Exercise Name 4 prone ext  -TW      Sets 4 3  -TW      Reps 4 10  -TW        User Key  (r) = Recorded By, (t) = Taken By, (c) = Cosigned By    Initials Name Provider Type    TW Marvin Hankins PTA Physical Therapy Assistant                               PT OP Goals       07/05/17 1645 07/05/17 1555    PT Short Term Goals    STG Date to Achieve  07/04/17  -TW    STG 1  Patient I with HEP  -TW    STG 1 Progress  Ongoing  -TW    STG 2  Improve lumbar spine AROM to minimal limit (all planes)  -TW    STG 2 Progress  Progressing  -TW    STG 3  Improve R L3 myotome strength (quads) to 5/5 level  -TW    STG 3 Progress  Ongoing  -TW    STG 4  Improve Modified Oswestry score to < or = 15/50   -TW    STG 4 Progress  Ongoing  -TW    Long Term Goals    LTG Date to Achieve  07/18/17  -TW    LTG 1  Improve lumbar spine AROM to WNL (all planes)  -TW    LTG 1 Progress  Ongoing  -TW    LTG 2  Improve Modified Oswestry score to < or = 10/50   -TW    LTG 2 Progress  Ongoing  -TW    LTG 3  Improve sitting tolerance to 1 hour with no increase in baseline lumbar spine pain  -TW    LTG 3 Progress  Ongoing  -TW    Time Calculation    PT Goal Re-Cert Due Date 06/11/17  -TW       User Key  (r) = Recorded By, (t) = Taken By, (c) = Cosigned By    Initials Name Provider Type     Marvin Hankins PTA Physical Therapy Assistant                Therapy Education       07/05/17 1555          Therapy Education    Given HEP;Pain management  -TW      Program Reinforced   -TW      How Provided Verbal;Demonstration  -TW      Provided to Patient  -TW      Level of Understanding Demonstrated;Verbalized  -TW        User Key  (r) = Recorded By, (t) = Taken By, (c) = Cosigned By    Initials Name Provider Type    TW Marvin Hankins PTA Physical Therapy Assistant                Time Calculation:   Start Time: 1554  Stop Time: 1642  Time Calculation (min): 48 min  PT Non-Billable Time (min): 10 min  Total Timed Code Minutes- PT: 38 minute(s)    Therapy Charges for Today     Code Description Service Date Service Provider Modifiers Qty    51750806311 HC PT THER PROC EA 15 MIN 7/5/2017 Marvin Hankins PTA GP 3    44813876250 HC PT THER SUPP EA 15 MIN 7/5/2017 Marvin Hankins PTA GP 1                    Marvin Hankins PTA  7/5/2017

## 2017-07-11 ENCOUNTER — OFFICE VISIT (OUTPATIENT)
Dept: PAIN MEDICINE | Facility: CLINIC | Age: 57
End: 2017-07-11

## 2017-07-11 VITALS
HEIGHT: 68 IN | DIASTOLIC BLOOD PRESSURE: 80 MMHG | SYSTOLIC BLOOD PRESSURE: 130 MMHG | WEIGHT: 128.7 LBS | BODY MASS INDEX: 19.51 KG/M2

## 2017-07-11 DIAGNOSIS — Z79.899 HIGH RISK MEDICATIONS (NOT ANTICOAGULANTS) LONG-TERM USE: ICD-10-CM

## 2017-07-11 DIAGNOSIS — M54.42 CHRONIC LOW BACK PAIN WITH LEFT-SIDED SCIATICA, UNSPECIFIED BACK PAIN LATERALITY: Primary | ICD-10-CM

## 2017-07-11 DIAGNOSIS — G89.29 CHRONIC LOW BACK PAIN WITH LEFT-SIDED SCIATICA, UNSPECIFIED BACK PAIN LATERALITY: Primary | ICD-10-CM

## 2017-07-11 DIAGNOSIS — M79.18 MYOFACIAL MUSCLE PAIN: ICD-10-CM

## 2017-07-11 DIAGNOSIS — F17.200 SMOKING: ICD-10-CM

## 2017-07-11 DIAGNOSIS — Z79.891 LONG TERM (CURRENT) USE OF OPIATE ANALGESIC: ICD-10-CM

## 2017-07-11 PROCEDURE — 99213 OFFICE O/P EST LOW 20 MIN: CPT | Performed by: PAIN MEDICINE

## 2017-07-11 NOTE — PROGRESS NOTES
Nancy Mcclure Sample is a 57 y.o. male.   1960    HPI:   Location: lower back  Quality: aching and throbbing  Severity: 2/10  Timing: constant  Alleviating: pain medication  Aggravating: ambulating  and increased activity    Pt works 5-6 days a week, chronic lower back pain- opiate medications allows relief. Opiate medications provides enough relief for daily activity and ambulation. NO SE. Pt happy with pain management visit and regimen.         The following portions of the patient's history were reviewed by me and updated as appropriate: allergies, current medications, past family history, past medical history, past social history, past surgical history and problem list.    Past Medical History:   Diagnosis Date   • Corneal FB (foreign body)     improved   • Hearing loss     for 30 years problems hearing high pitche dsounds.   • Measles    • Pneumonia     2016 dec       Social History     Social History   • Marital status:      Spouse name: N/A   • Number of children: N/A   • Years of education: N/A     Occupational History   • Not on file.     Social History Main Topics   • Smoking status: Heavy Tobacco Smoker     Packs/day: 1.00     Types: Cigarettes   • Smokeless tobacco: Never Used      Comment: Smoking Cessation discussed   • Alcohol use Yes      Comment: ocassionally   • Drug use: No   • Sexual activity: Defer     Other Topics Concern   • Not on file     Social History Narrative       Family History   Problem Relation Age of Onset   • Cancer Mother    • Cancer Father          Current Outpatient Prescriptions:   •  albuterol (PROVENTIL HFA;VENTOLIN HFA) 108 (90 BASE) MCG/ACT inhaler, Inhale 2 puffs Every 6 (Six) Hours., Disp: , Rfl:   •  carisoprodol (SOMA) 350 MG tablet, Take 350 mg by mouth 4 (Four) Times a Day., Disp: , Rfl:   •  carisoprodol (SOMA) 350 MG tablet, Take 1 tablet by mouth 2 (Two) Times a Day., Disp: 60 tablet, Rfl: 1  •  carisoprodol (SOMA) 350 MG tablet, Take 350 mg by mouth 4  (Four) Times a Day., Disp: , Rfl:   •  HYDROcodone-acetaminophen (NORCO)  MG per tablet, , Disp: , Rfl:   •  HYDROcodone-acetaminophen (NORCO)  MG per tablet, , Disp: , Rfl:   •  HYDROcodone-GuaiFENesin (NARCOF PO), Take  by mouth., Disp: , Rfl:   •  Multiple Vitamins-Minerals (CENTRUM SILVER ADULT 50+ PO), Take  by mouth., Disp: , Rfl:   •  pseudoephedrine-guaifenesin (MUCINEX D)  MG per 12 hr tablet, , Disp: , Rfl: 0  •  VENTOLIN  (90 BASE) MCG/ACT inhaler, , Disp: , Rfl: 0    No Known Allergies    Review of Systems   Musculoskeletal: Positive for back pain (lower).   All other systems reviewed and are negative.    All systems reviewed and negative except for above.    Physical Exam   Constitutional: He appears well-developed and well-nourished. No distress.   HENT:   Head: Normocephalic and atraumatic.   Eyes: Pupils are equal, round, and reactive to light.   Neck: Normal range of motion and full passive range of motion without pain. No spinous process tenderness and no muscular tenderness present. Normal range of motion present.   Cardiovascular: Normal rate and regular rhythm.    Pulmonary/Chest: Effort normal. No respiratory distress.   Musculoskeletal:        Lumbar back: He exhibits decreased range of motion ( flex 90 deg and 15 deg with FL right sided) and tenderness (TTP muscles midline).   Neurological: He is alert. He displays normal reflexes. No sensory deficit. He exhibits normal muscle tone. Coordination and gait normal.   Reflex Scores:       Tricep reflexes are 2+ on the right side and 2+ on the left side.       Bicep reflexes are 2+ on the right side and 2+ on the left side.       Brachioradialis reflexes are 2+ on the right side and 2+ on the left side.       Patellar reflexes are 1+ on the right side and 1+ on the left side.       Achilles reflexes are 1+ on the right side and 1+ on the left side.  SLR right slight     Skin: Skin is warm, dry and intact. No rash noted. He  is not diaphoretic.   Psychiatric: He has a normal mood and affect. His behavior is normal. Judgment normal. He expresses no homicidal and no suicidal ideation. He expresses no suicidal plans and no homicidal plans.   Nursing note and vitals reviewed.      Nancy was seen today for back pain.    Diagnoses and all orders for this visit:    Chronic low back pain with left-sided sciatica, unspecified back pain laterality    Smoking    Long term (current) use of opiate analgesic    High risk medications (not anticoagulants) long-term use    Myofacial muscle pain        Medication: Patient reports no negative side effects, Patient reports appropriate usage and storage habits and Patient's opioid provides enough reflief to be more active and perform activities of daily living with less discomfort. Soma 350-mg bid and Norco 10mg qid. Discussed case with Volodymyr Mesa, opiate medication refilled ×3 hand written scripts.      Interventional:Smoking cessation discussion was given, but I am unsure of the compliance.   ISABEL and any neurological impairments discussed with patient that may need of emergency evaluation.      Rehab: works 5 days a week.     Behavioral: No aberrant behavior noted. SARAH Report # [id number]  was reviewed and is consistent with stated history    Urine drug screen None at this time        This document has been electronically signed by RAHEEM Alvarado on July 11, 2017 5:02 PM          This document has been electronically signed by RAHEEM Alvarado on July 11, 2017 5:02 PM

## 2017-07-12 ENCOUNTER — HOSPITAL ENCOUNTER (OUTPATIENT)
Dept: PHYSICAL THERAPY | Facility: HOSPITAL | Age: 57
Setting detail: THERAPIES SERIES
Discharge: HOME | End: 2017-07-12

## 2017-07-12 DIAGNOSIS — M54.40 CHRONIC LOW BACK PAIN WITH SCIATICA, SCIATICA LATERALITY UNSPECIFIED, UNSPECIFIED BACK PAIN LATERALITY: Primary | ICD-10-CM

## 2017-07-12 DIAGNOSIS — G89.29 CHRONIC LOW BACK PAIN WITH SCIATICA, SCIATICA LATERALITY UNSPECIFIED, UNSPECIFIED BACK PAIN LATERALITY: Primary | ICD-10-CM

## 2017-07-12 PROCEDURE — 97110 THERAPEUTIC EXERCISES: CPT

## 2017-07-12 NOTE — THERAPY TREATMENT NOTE
Outpatient Physical Therapy Ortho Treatment Note  [facility]     Patient Name: Nancy Mcclure Sample  : 1960  MRN: [id number]  Today's Date: 2017      Visit Date: 2017    Subjective Improvement:    Visits Attended:    Visits Approved    MD visit:    Recert Date:        Visit Dx:    ICD-10-CM ICD-9-CM   1. Chronic low back pain with sciatica, sciatica laterality unspecified, unspecified back pain laterality M54.40 724.2    G89.29 724.3     338.29       Patient Active Problem List   Diagnosis   • Chronic low back pain with sciatica   • Myofacial muscle pain   • Long term (current) use of opiate analgesic   • Smoking   • High risk medications (not anticoagulants) long-term use        Past Medical History:   Diagnosis Date   • Corneal FB (foreign body)     improved   • Hearing loss     for 30 years problems hearing high pitche dsounds.   • Measles    • Pneumonia     2016 dec        Past Surgical History:   Procedure Laterality Date   • EYE SURGERY  04/15/2016    remove foreign body cornea with slit lamp   • VASECTOMY                                   PT Assessment/Plan       17 1600       PT Assessment    Assessment Comments Pt able to tolerate increase activity this tx with no increase in pain noted  -TW     PT Plan    PT Frequency 1x/week  -TW     Predicted Duration of Therapy Intervention (days/wks) 4 weeks  -TW     PT Plan Comments Pt scheduled for recert next week. Cont per POC as pt able to tolerate.  -TW       User Key  (r) = Recorded By, (t) = Taken By, (c) = Cosigned By    Initials Name Provider Type    TW Marvin Hankins PTA Physical Therapy Assistant                Modalities       17 1600          Ice    Ice Applied Yes  -TW      Location lumbar spine  -TW      Rx Minutes 10 mins  -TW      Ice S/P Rx Yes  -TW        User Key  (r) = Recorded By, (t) = Taken By, (c) = Cosigned By    Initials Name Provider Type    TW Marvin Hankins PTA Physical  Therapy Assistant                Exercises       07/12/17 1600          Subjective Comments    Subjective Comments Pt reports working 10-12 hour days right now and says this is causing his back to be more sore than usual.  -TW      Subjective Pain    Able to rate subjective pain? yes  -TW      Pre-Treatment Pain Level 4  -TW      Post-Treatment Pain Level 4  -TW      Exercise 1    Exercise Name 1 Pro II level 2   -TW      Time (Minutes) 1 10  -TW      Exercise 2    Exercise Name 2 incline stretch  -TW      Sets 2 3  -TW      Time (Seconds) 2 30  -TW      Exercise 3    Exercise Name 3 B supine HS stretch with towel  -TW      Sets 3 5  -TW      Time (Seconds) 3 30  -TW      Exercise 4    Exercise Name 4 bridges  -TW      Sets 4 2  -TW      Reps 4 20  -TW      Exercise 5    Exercise Name 5 seated B piri stretch   Pt did 2 sets seated and 1 set supine.  -TW      Sets 5 3  -TW      Time (Seconds) 5 30  -TW      Exercise 6    Exercise Name 6 standing HS stretch  -TW      Sets 6 3  -TW      Time (Seconds) 6 30  -TW      Exercise 7    Exercise Name 7 lunge stretch on step  -TW      Sets 7 3  -TW      Time (Seconds) 7 30  -TW        User Key  (r) = Recorded By, (t) = Taken By, (c) = Cosigned By    Initials Name Provider Type    TW Marvin Hankins PTA Physical Therapy Assistant                               PT OP Goals       07/12/17 1658 07/12/17 1600    PT Short Term Goals    STG Date to Achieve  07/04/17  -TW    STG 1  Patient I with HEP  -TW    STG 1 Progress  Ongoing  -TW    STG 2  Improve lumbar spine AROM to minimal limit (all planes)  -TW    STG 2 Progress  Progressing  -TW    STG 3  Improve R L3 myotome strength (quads) to 5/5 level  -TW    STG 3 Progress  Ongoing  -TW    STG 4  Improve Modified Oswestry score to < or = 15/50   -TW    STG 4 Progress  Ongoing  -TW    Long Term Goals    LTG Date to Achieve  07/18/17  -TW    LTG 1  Improve lumbar spine AROM to WNL (all planes)  -TW    LTG 1 Progress  Ongoing  -TW     LTG 2  Improve Modified Oswestry score to < or = 10/50   -TW    LTG 2 Progress  Ongoing  -TW    LTG 3  Improve sitting tolerance to 1 hour with no increase in baseline lumbar spine pain  -TW    LTG 3 Progress  Ongoing  -TW    Time Calculation    PT Goal Re-Cert Due Date 07/17/17  -TW       User Key  (r) = Recorded By, (t) = Taken By, (c) = Cosigned By    Initials Name Provider Type    DARVIN Hankins PTA Physical Therapy Assistant                Therapy Education       07/12/17 1600          Therapy Education    Given HEP;Pain management  -TW      Program New  -TW      How Provided Verbal  -TW      Provided to Patient  -TW      Level of Understanding Verbalized;Demonstrated  -TW        User Key  (r) = Recorded By, (t) = Taken By, (c) = Cosigned By    Initials Name Provider Type    DARVIN Hankins PTA Physical Therapy Assistant                Time Calculation:   Start Time: 1546  Stop Time: 1650  Time Calculation (min): 64 min  PT Non-Billable Time (min): 10 min  Total Timed Code Minutes- PT: 54 minute(s)    Therapy Charges for Today     Code Description Service Date Service Provider Modifiers Qty    89726227107 HC PT THER PROC EA 15 MIN 7/12/2017 Marvin Hankins PTA GP 4    22593868831 HC PT THER SUPP EA 15 MIN 7/12/2017 Marvin Hankins PTA GP 1                    Marvin Hankins PTA  7/12/2017

## 2017-07-19 ENCOUNTER — HOSPITAL ENCOUNTER (OUTPATIENT)
Dept: PHYSICAL THERAPY | Facility: HOSPITAL | Age: 57
Setting detail: THERAPIES SERIES
End: 2017-07-19

## 2017-07-20 ENCOUNTER — HOSPITAL ENCOUNTER (OUTPATIENT)
Dept: PHYSICAL THERAPY | Facility: HOSPITAL | Age: 57
Setting detail: THERAPIES SERIES
Discharge: HOME | End: 2017-07-20

## 2017-07-20 DIAGNOSIS — M54.40 CHRONIC LOW BACK PAIN WITH SCIATICA, SCIATICA LATERALITY UNSPECIFIED, UNSPECIFIED BACK PAIN LATERALITY: Primary | ICD-10-CM

## 2017-07-20 DIAGNOSIS — G89.29 CHRONIC LOW BACK PAIN WITH SCIATICA, SCIATICA LATERALITY UNSPECIFIED, UNSPECIFIED BACK PAIN LATERALITY: Primary | ICD-10-CM

## 2017-07-20 PROCEDURE — 97110 THERAPEUTIC EXERCISES: CPT

## 2017-07-20 PROCEDURE — 97530 THERAPEUTIC ACTIVITIES: CPT | Performed by: PHYSICAL THERAPIST

## 2017-07-20 NOTE — THERAPY PROGRESS REPORT/RE-CERT
Outpatient Physical Therapy Ortho Progress Note  [facility]     Patient Name: Nancy Mcclure Sample  : 1960  MRN: [id number]  Today's Date: 2017      Visit Date: 2017    Recert date 8/10/17        Visit Dx:    ICD-10-CM ICD-9-CM   1. Chronic low back pain with sciatica, sciatica laterality unspecified, unspecified back pain laterality M54.40 724.2    G89.29 724.3     338.29       Patient Active Problem List   Diagnosis   • Chronic low back pain with sciatica   • Myofacial muscle pain   • Long term (current) use of opiate analgesic   • Smoking   • High risk medications (not anticoagulants) long-term use        Past Medical History:   Diagnosis Date   • Corneal FB (foreign body)     improved   • Hearing loss     for 30 years problems hearing high pitche dsounds.   • Measles    • Pneumonia     2016 dec        Past Surgical History:   Procedure Laterality Date   • EYE SURGERY  04/15/2016    remove foreign body cornea with slit lamp   • VASECTOMY                   PT Ortho       17 1600    Neural Tension Signs- Lower Quarter Clearing    Slump Bilateral:;Negative  -KW    SLR Right:;Positive;Left:;Negative  -KW    Lumbar ROM Screen- Lower Quarter Clearing    Lumbar Flexion Normal  -KW    Lumbar Extension Normal  -KW    Lumbar Lateral Flexion Normal  -KW    Lumbar Rotation Normal  -KW    Lumbosacral Palpation    SI Bilateral:  -KW    Lumbosacral Segment Right:;Tender;Guarded/taut;Elicits spasm  -KW    Spinous Process Tender  -KW    Piriformis Bilateral:;Elicits spasm  -KW    Gluteus Jeff Bilateral:  -KW    Quadratus Lumborum Right:;Tender;Elicits spasm  -KW    Erector Spinae (Paraspinals) Right:;Tender;Guarded/taut;Elicits spasm  -KW    ROM (Range of Motion)    General ROM Detail All lumbar ROM is now WFL but painful at end range  -KW      User Key  (r) = Recorded By, (t) = Taken By, (c) = Cosigned By    Initials Name Provider Type    USMAN Cassidy, PT Physical Therapist                             PT Assessment/Plan       07/20/17 1729       PT Assessment    Functional Limitations Performance in self-care ADL;Performance in leisure activities  -KW     Impairments Range of motion;Pain;Muscle strength;Poor body mechanics;Posture;Sensation  -KW     Assessment Comments Pt now has increased lumbar ROM but no change in pain.  -KW     Please refer to paper survey for additional self-reported information No  -KW     Rehab Potential Good  -KW     Patient/caregiver participated in establishment of treatment plan and goals Yes  -KW     Patient would benefit from skilled therapy intervention Yes  -KW     PT Plan    PT Frequency 1x/week  -KW     Predicted Duration of Therapy Intervention (days/wks) 4 weeks  -KW     Planned CPT's? PT RE-EVAL: 55500;PT THER PROC EA 15 MIN: 29684;PT THER ACT EA 15 MIN: 19429;PT THER SUPP EA 15 MIN;PT NEUROMUSC RE-EDUCATION EA 15 MIN: 13269;PT MANUAL THERAPY EA 15 MIN: 27217;PT ELECTRICAL STIM UNATTEND: ;PT ULTRASOUND EA 15 MIN: 58711;PT TRACTION LUMBAR: 33918;PT HOT/COLD PACK WC NONMCARE: 75393  -KW     Physical Therapy Interventions (Optional Details) home exercise program;joint mobilization;lumbar stabilization;manual therapy techniques;modalities;neuromuscular re-education;patient/family education;postural re-education;strengthening;stretching  -KW     PT Plan Comments Cont POC for LBP as tolerated  -KW       User Key  (r) = Recorded By, (t) = Taken By, (c) = Cosigned By    Initials Name Provider Type     Kim Cassidy, PT Physical Therapist                    Exercises       07/20/17 1600          Subjective Comments    Subjective Comments Pain is always there  -CP      Subjective Pain    Able to rate subjective pain? yes  -CP      Pre-Treatment Pain Level 4  -CP      Aquatics    Aquatics performed? No  -CP      Exercise 1    Exercise Name 1 Pro II level 3  -CP      Time (Minutes) 1 10  -CP      Exercise 2    Exercise Name 2 Incline Stretch  -CP       Sets 2 3  -CP      Time (Seconds) 2 30  -CP      Exercise 3    Exercise Name 3 HS Stretch Bilateral  -CP      Sets 3 3  -CP      Time (Seconds) 3 30  -CP      Exercise 4    Exercise Name 4 standing hip fl stretch bilateral  -CP      Sets 4 3  -CP      Time (Seconds) 4 30  -CP      Exercise 5    Exercise Name 5 Scap row on swiss ball  -CP      Equipment 5 Theraband  -CP      Resistance 5 Green  -CP      Sets 5 3  -CP      Reps 5 10  -CP      Exercise 6    Exercise Name 6 Prone press up  -CP      Sets 6 2  -CP      Reps 6 10  -CP      Time (Seconds) 6 3-5  -CP      Exercise 7    Exercise Name 7 Prone on elbow  -CP      Time (Minutes) 7 5  -CP        User Key  (r) = Recorded By, (t) = Taken By, (c) = Cosigned By    Initials Name Provider Type    CP Zohra Garcia PTA Physical Therapy Assistant                        Manual Rx (last 36 hours)      Manual Treatments       07/20/17 1500          Manual Rx 1    Manual Rx 1 Location Lumbar spine  -CP      Manual Rx 1 Type PA mobs  -CP      Manual Rx 1 Grade grade 2  -CP      Manual Rx 1 Duration 3  -CP        User Key  (r) = Recorded By, (t) = Taken By, (c) = Cosigned By    Initials Name Provider Type    CP Zohra Garcia PTA Physical Therapy Assistant                PT OP Goals       07/20/17 1700       PT Short Term Goals    STG Date to Achieve 08/03/17  -KW     STG 1 Patient I with HEP  -KW     STG 1 Progress Ongoing  -KW     STG 2 Improve lumbar spine AROM to minimal limit (all planes)  -KW     STG 2 Progress Progressing  -KW     STG 3 Improve R L3 myotome strength (quads) to 5/5 level  -KW     STG 3 Progress Ongoing  -KW     STG 4 Improve Modified Oswestry score to < or = 15/50   -KW     STG 4 Progress Ongoing  -KW     Long Term Goals    LTG Date to Achieve 08/10/17  -KW     LTG 1 Improve lumbar spine AROM to WNL (all planes)  -KW     LTG 1 Progress Ongoing  -KW     LTG 2 Improve Modified Oswestry score to < or = 10/50   -KW     LTG 2 Progress Ongoing  -KW      LTG 3 Improve sitting tolerance to 1 hour with no increase in baseline lumbar spine pain  -KW     LTG 3 Progress Ongoing  -KW     Time Calculation    PT Goal Re-Cert Due Date 08/10/17  -KW       User Key  (r) = Recorded By, (t) = Taken By, (c) = Cosigned By    Initials Name Provider Type    USMAN Cassidy, TERRI Physical Therapist                Therapy Education       07/20/17 1728          Therapy Education    Given HEP  -KW      Program Reinforced  -KW      How Provided Verbal  -KW      Provided to Patient  -KW      Level of Understanding Verbalized  -KW        User Key  (r) = Recorded By, (t) = Taken By, (c) = Cosigned By    Initials Name Provider Type    USMAN Cassidy, PT Physical Therapist                Time Calculation:   Start Time: 1645  Stop Time: 1700  Time Calculation (min): 15 min  Total Timed Code Minutes- PT: 15 minute(s)    Therapy Charges for Today     Code Description Service Date Service Provider Modifiers Qty    84672304330 HC PT THERAPEUTIC ACT EA 15 MIN 7/20/2017 Kim Cassidy, PT GP 1                    Kim Cassidy, PT  7/20/2017

## 2017-07-20 NOTE — THERAPY TREATMENT NOTE
Outpatient Physical Therapy Ortho Treatment Note  [facility]     Patient Name: Nancy Mcclure Sample  : 1960  MRN: [id number]  Today's Date: 2017      Visit Date: 2017     Subjective Improvement 0  Visits 5/6  Visits approved 25 per year  RTMD PRN  Recert Date 8-    Low back pain    Visit Dx:    ICD-10-CM ICD-9-CM   1. Chronic low back pain with sciatica, sciatica laterality unspecified, unspecified back pain laterality M54.40 724.2    G89.29 724.3     338.29       Patient Active Problem List   Diagnosis   • Chronic low back pain with sciatica   • Myofacial muscle pain   • Long term (current) use of opiate analgesic   • Smoking   • High risk medications (not anticoagulants) long-term use        Past Medical History:   Diagnosis Date   • Corneal FB (foreign body)     improved   • Hearing loss     for 30 years problems hearing high pitche dsounds.   • Measles    • Pneumonia     2016 dec        Past Surgical History:   Procedure Laterality Date   • EYE SURGERY  04/15/2016    remove foreign body cornea with slit lamp   • VASECTOMY                   PT Ortho       17 1600    Subjective Pain    Post-Treatment Pain Level 4  -CP    Neural Tension Signs- Lower Quarter Clearing    Slump Bilateral:;Negative  -KW    SLR Right:;Positive;Left:;Negative  -KW    Lumbar ROM Screen- Lower Quarter Clearing    Lumbar Flexion Normal  -KW    Lumbar Extension Normal  -KW    Lumbar Lateral Flexion Normal  -KW    Lumbar Rotation Normal  -KW    Lumbosacral Palpation    SI Bilateral:  -KW    Lumbosacral Segment Right:;Tender;Guarded/taut;Elicits spasm  -KW    Spinous Process Tender  -KW    Piriformis Bilateral:;Elicits spasm  -KW    Gluteus Jeff Bilateral:  -KW    Quadratus Lumborum Right:;Tender;Elicits spasm  -KW    Erector Spinae (Paraspinals) Right:;Tender;Guarded/taut;Elicits spasm  -KW    ROM (Range of Motion)    General ROM Detail All lumbar ROM is now WFL but painful at end range  -KW       User Key  (r) = Recorded By, (t) = Taken By, (c) = Cosigned By    Initials Name Provider Type    CP Zohra Garcia, PTA Physical Therapy Assistant    USMAN Cassidy, PT Physical Therapist                            PT Assessment/Plan       07/20/17 1729       PT Assessment    Functional Limitations Performance in self-care ADL;Performance in leisure activities  -KW     Impairments Range of motion;Pain;Muscle strength;Poor body mechanics;Posture;Sensation  -KW     Assessment Comments Pt now has increased lumbar ROM but no change in pain.  -KW     Please refer to paper survey for additional self-reported information No  -KW     Rehab Potential Good  -KW     Patient/caregiver participated in establishment of treatment plan and goals Yes  -KW     Patient would benefit from skilled therapy intervention Yes  -KW     PT Plan    PT Frequency 1x/week  -KW     Predicted Duration of Therapy Intervention (days/wks) 4 weeks  -KW     Planned CPT's? PT RE-EVAL: 31638;PT THER PROC EA 15 MIN: 85857;PT THER ACT EA 15 MIN: 95161;PT THER SUPP EA 15 MIN;PT NEUROMUSC RE-EDUCATION EA 15 MIN: 49432;PT MANUAL THERAPY EA 15 MIN: 65826;PT ELECTRICAL STIM UNATTEND: ;PT ULTRASOUND EA 15 MIN: 16356;PT TRACTION LUMBAR: 94880;PT HOT/COLD PACK WC NONMCARE: 67050  -KW     Physical Therapy Interventions (Optional Details) home exercise program;joint mobilization;lumbar stabilization;manual therapy techniques;modalities;neuromuscular re-education;patient/family education;postural re-education;strengthening;stretching  -KW     PT Plan Comments Cont POC for LBP as tolerated  -KW       User Key  (r) = Recorded By, (t) = Taken By, (c) = Cosigned By    Initials Name Provider Type    USMAN Cassidy, PT Physical Therapist                    Exercises       07/20/17 1600          Subjective Comments    Subjective Comments Pain is always there  -CP      Subjective Pain    Able to rate subjective pain? yes  -CP      Pre-Treatment Pain Level 4   -CP      Post-Treatment Pain Level 4  -CP      Aquatics    Aquatics performed? No  -CP      Exercise 1    Exercise Name 1 Pro II level 3  -CP      Time (Minutes) 1 10  -CP      Exercise 2    Exercise Name 2 Incline Stretch  -CP      Sets 2 3  -CP      Time (Seconds) 2 30  -CP      Exercise 3    Exercise Name 3 HS Stretch Bilateral  -CP      Sets 3 3  -CP      Time (Seconds) 3 30  -CP      Exercise 4    Exercise Name 4 standing hip fl stretch bilateral  -CP      Sets 4 3  -CP      Time (Seconds) 4 30  -CP      Exercise 5    Exercise Name 5 Scap row on swiss ball  -CP      Equipment 5 Theraband  -CP      Resistance 5 Green  -CP      Sets 5 3  -CP      Reps 5 10  -CP      Exercise 6    Exercise Name 6 Prone press up  -CP      Sets 6 2  -CP      Reps 6 10  -CP      Time (Seconds) 6 3-5  -CP      Exercise 7    Exercise Name 7 Prone on elbow  -CP      Time (Minutes) 7 5  -CP        User Key  (r) = Recorded By, (t) = Taken By, (c) = Cosigned By    Initials Name Provider Type    CP Zohra Garcia PTA Physical Therapy Assistant                        Manual Rx (last 36 hours)      Manual Treatments       07/20/17 1500          Manual Rx 1    Manual Rx 1 Location Lumbar spine  -CP      Manual Rx 1 Type PA mobs  -CP      Manual Rx 1 Grade grade 2  -CP      Manual Rx 1 Duration 3  -CP        User Key  (r) = Recorded By, (t) = Taken By, (c) = Cosigned By    Initials Name Provider Type    CP Zohra Garcia PTA Physical Therapy Assistant                PT OP Goals       07/20/17 1700       PT Short Term Goals    STG Date to Achieve 08/03/17  -KW     STG 1 Patient I with HEP  -KW     STG 1 Progress Ongoing  -KW     STG 2 Improve lumbar spine AROM to minimal limit (all planes)  -KW     STG 2 Progress Progressing  -KW     STG 3 Improve R L3 myotome strength (quads) to 5/5 level  -KW     STG 3 Progress Ongoing  -KW     STG 4 Improve Modified Oswestry score to < or = 15/50   -KW     STG 4 Progress Ongoing  -KW     Long Term  Goals    LTG Date to Achieve 08/10/17  -KW     LTG 1 Improve lumbar spine AROM to WNL (all planes)  -KW     LTG 1 Progress Ongoing  -KW     LTG 2 Improve Modified Oswestry score to < or = 10/50   -KW     LTG 2 Progress Ongoing  -KW     LTG 3 Improve sitting tolerance to 1 hour with no increase in baseline lumbar spine pain  -KW     LTG 3 Progress Ongoing  -KW     Time Calculation    PT Goal Re-Cert Due Date 08/10/17  -KW       User Key  (r) = Recorded By, (t) = Taken By, (c) = Cosigned By    Initials Name Provider Type    USMAN Cassidy, PT Physical Therapist                Therapy Education       07/20/17 1728          Therapy Education    Given HEP  -KW      Program Reinforced  -KW      How Provided Verbal  -KW      Provided to Patient  -KW      Level of Understanding Verbalized  -KW        User Key  (r) = Recorded By, (t) = Taken By, (c) = Cosigned By    Initials Name Provider Type    USMAN Cassidy, PT Physical Therapist                Time Calculation:   Start Time: 1605  Stop Time: 1700  Time Calculation (min): 55 min  Total Timed Code Minutes- PT: 55 minute(s)    Therapy Charges for Today     Code Description Service Date Service Provider Modifiers Qty    89288532132 HC PT THER PROC EA 15 MIN 7/20/2017 Zohra Garcia, PTA GP 3                    Zohra Garcia PTA  7/20/2017

## 2017-07-26 ENCOUNTER — HOSPITAL ENCOUNTER (OUTPATIENT)
Dept: PHYSICAL THERAPY | Facility: HOSPITAL | Age: 57
Setting detail: THERAPIES SERIES
End: 2017-07-26

## 2017-08-02 ENCOUNTER — HOSPITAL ENCOUNTER (OUTPATIENT)
Dept: PHYSICAL THERAPY | Facility: HOSPITAL | Age: 57
Setting detail: THERAPIES SERIES
Discharge: HOME | End: 2017-08-02

## 2017-08-02 DIAGNOSIS — G89.29 CHRONIC LOW BACK PAIN WITH SCIATICA, SCIATICA LATERALITY UNSPECIFIED, UNSPECIFIED BACK PAIN LATERALITY: Primary | ICD-10-CM

## 2017-08-02 DIAGNOSIS — M54.40 CHRONIC LOW BACK PAIN WITH SCIATICA, SCIATICA LATERALITY UNSPECIFIED, UNSPECIFIED BACK PAIN LATERALITY: Primary | ICD-10-CM

## 2017-08-02 PROCEDURE — G0283 ELEC STIM OTHER THAN WOUND: HCPCS

## 2017-08-02 PROCEDURE — 97012 MECHANICAL TRACTION THERAPY: CPT

## 2017-08-02 PROCEDURE — 97110 THERAPEUTIC EXERCISES: CPT

## 2017-08-02 PROCEDURE — 97140 MANUAL THERAPY 1/> REGIONS: CPT

## 2017-08-02 NOTE — THERAPY TREATMENT NOTE
Outpatient Physical Therapy Ortho Treatment Note  [facility]     Patient Name: Nancy Mcclure Sample  : 1960  MRN: [id number]  Today's Date: 2017      Visit Date: 2017  Pt. Has attended  scheduled visits  Recert 08-10-17  Some subjective improvement  Visit Dx:    ICD-10-CM ICD-9-CM   1. Chronic low back pain with sciatica, sciatica laterality unspecified, unspecified back pain laterality M54.40 724.2    G89.29 724.3     338.29       Patient Active Problem List   Diagnosis   • Chronic low back pain with sciatica   • Myofacial muscle pain   • Long term (current) use of opiate analgesic   • Smoking   • High risk medications (not anticoagulants) long-term use        Past Medical History:   Diagnosis Date   • Corneal FB (foreign body)     improved   • Hearing loss     for 30 years problems hearing high pitche dsounds.   • Measles    • Pneumonia     2016 dec        Past Surgical History:   Procedure Laterality Date   • EYE SURGERY  04/15/2016    remove foreign body cornea with slit lamp   • VASECTOMY                   PT Ortho       17 1600    ROM (Range of Motion)    General ROM --   B shld ROM WNL  -SP    General Head/Neck/Trunk Assessment    ROM Detail Cercvical L rotation 60, R rotation 66, Flexion 60 and ext 35  -SP      User Key  (r) = Recorded By, (t) = Taken By, (c) = Cosigned By    Initials Name Provider Type    LALY Dolan, PTA Physical Therapy Assistant                            PT Assessment/Plan       17 1709 17 1658    PT Assessment    Assessment Comments ATolerates exercises well no increase in discomfort after therapy  -SP Tolerates exercise well no increase in discomfort after Rx  -SP    PT Plan    PT Frequency 1x/week  -SP 2x/week  -SP    PT Plan Comments Continue with POC recert with Primary pt next week  -SP Continue with POC  -SP      17 1630 17 1621    PT Assessment    Assessment Comments Tolerates cervical traction well audible t  spine mobilization notes increaed tightness on L rotation  -SP Tolertes cervical static traction well ROM measures taken sent MD note with patient  -SP    PT Plan    PT Frequency 2x/week  -SP 2x/week  -SP    PT Plan Comments Continue with POC  -SP Continue with POC  -SP      User Key  (r) = Recorded By, (t) = Taken By, (c) = Cosigned By    Initials Name Provider Type    SP Lala Dolan PTA Physical Therapy Assistant                Modalities       08/02/17 1600          Ice    Ice S/P Rx --   ice to go  -SP        User Key  (r) = Recorded By, (t) = Taken By, (c) = Cosigned By    Initials Name Provider Type    SP Lala Dolan PTA Physical Therapy Assistant                Exercises       08/02/17 1600          Subjective Comments    Subjective Comments Notes his mobility has improved some pain still present  -SP      Subjective Pain    Able to rate subjective pain? yes  -SP      Pre-Treatment Pain Level 3  -SP      Post-Treatment Pain Level 2  -SP      Aquatics    Aquatics performed? No  -SP      Exercise 1    Exercise Name 1 Pro 2 level 3  -SP      Time (Minutes) 1 10 min  -SP      Exercise 2    Exercise Name 2 Incline S, HS S  -SP      Sets 2 3  -SP      Time (Seconds) 2 30 sec  -SP      Exercise 3    Exercise Name 3 tball LTR  -SP      Reps 3 10  -SP      Exercise 4    Exercise Name 4 tball piriformis S  -SP      Reps 4 3  -SP      Time (Seconds) 4 30 sec  -SP      Exercise 5    Exercise Name 5 tball modified midback stretch  -SP      Reps 5 3  -SP      Time (Seconds) 5 30 sec  -SP      Exercise 6    Exercise Name 6 tball bridge with add  -SP      Sets 6 2  -SP      Reps 6 10  -SP      Exercise 7    Exercise Name 7 ovidio disc seated ball over head lifts  -SP      Reps 7 10  -SP      Exercise 8    Exercise Name 8 ovidio disc seated march  -SP      Reps 8 15  -SP      Exercise 9    Exercise Name 9 ovidio disc LAQ with add  -SP      Reps 9 15  -SP        User Key  (r) = Recorded By, (t) = Taken By, (c) = Cosigned  By    Initials Name Provider Type    LALY Dolan, PTA Physical Therapy Assistant                               PT OP Goals       08/02/17 1710 08/02/17 1630    PT Short Term Goals    STG Date to Achieve  08/03/17  -SP    STG 1  Patient I with HEP  -SP    STG 1 Progress  Ongoing  -SP    STG 2  Improve lumbar spine AROM to minimal limit (all planes)  -SP    STG 2 Progress  Progressing  -SP    STG 3  Improve R L3 myotome strength (quads) to 5/5 level  -SP    STG 3 Progress  Ongoing  -SP    STG 4  Improve Modified Oswestry score to < or = 15/50   -SP    STG 4 Progress  Ongoing  -SP    Long Term Goals    LTG Date to Achieve  08/10/17  -SP    LTG 1  Improve lumbar spine AROM to WNL (all planes)  -SP    LTG 1 Progress  Ongoing  -SP    LTG 2  Improve Modified Oswestry score to < or = 10/50   -SP    LTG 2 Progress  Ongoing  -SP    LTG 3  Improve sitting tolerance to 1 hour with no increase in baseline lumbar spine pain  -SP    LTG 3 Progress  Ongoing  -SP    Time Calculation    PT Goal Re-Cert Due Date 08/08/17  -SP 08/07/17  -SP      08/02/17 1600       PT Short Term Goals    STG Date to Achieve 08/03/17  -SP     STG 1 Patient I with HEP  -SP     STG 1 Progress Ongoing  -SP     STG 2 Improve lumbar spine AROM to minimal limit (all planes)  -SP     STG 2 Progress Progressing  -SP     STG 3 Improve R L3 myotome strength (quads) to 5/5 level  -SP     STG 3 Progress Ongoing  -SP     STG 4 Improve Modified Oswestry score to < or = 15/50   -SP     STG 4 Progress Ongoing  -SP     Long Term Goals    LTG Date to Achieve 08/10/17  -SP     LTG 1 Improve lumbar spine AROM to WNL (all planes)  -SP     LTG 1 Progress Ongoing  -SP     LTG 2 Improve Modified Oswestry score to < or = 10/50   -SP     LTG 2 Progress Ongoing  -SP     LTG 3 Improve sitting tolerance to 1 hour with no increase in baseline lumbar spine pain  -SP     LTG 3 Progress Ongoing  -SP     Time Calculation    PT Goal Re-Cert Due Date 08/10/17  -SP       User Key   (r) = Recorded By, (t) = Taken By, (c) = Cosigned By    Initials Name Provider Type    SP Lala Dolan PTA Physical Therapy Assistant                    Time Calculation:   Start Time: 1610  Stop Time: 1705  Time Calculation (min): 55 min  Total Timed Code Minutes- PT: 55 minute(s)    Therapy Charges for Today     Code Description Service Date Service Provider Modifiers Qty    72344545983 HC PT THER SUPP EA 15 MIN 8/2/2017 Lala Dolan, PTA GP 1    47527784506 HC PT MANUAL THERAPY EA 15 MIN 8/2/2017 Lala Dolan, PTA GP 1    87831562416 HC PT THER PROC EA 15 MIN 8/2/2017 Lala Dolan, PTA GP 1    00325673639 HC PT ELECTRICAL STIM UNATTENDED 8/2/2017 Lala Dolan PTA  1    21271266916 HC PT TRACTION CERVICAL 8/2/2017 Lala Dolan, PTA GP 1    76679691781 HC PT THER PROC EA 15 MIN 8/2/2017 Lala Dolan, AMAURI GP 4                    Lala Dolan PTA  8/2/2017

## 2017-08-02 NOTE — THERAPY TREATMENT NOTE
Outpatient Physical Therapy Ortho Treatment Note  [facility]     Patient Name: Nancy Mcclure Sample  : 1960  MRN: [id number]  Today's Date: 2017      Visit Date: 2017  Pt. Has attended  visits  Subjective improvement 75 percent  MD 17  4 visits remaining [id number]  Visit Dx:    ICD-10-CM ICD-9-CM   1. Chronic low back pain with sciatica, sciatica laterality unspecified, unspecified back pain laterality M54.40 724.2    G89.29 724.3     338.29       Patient Active Problem List   Diagnosis   • Chronic low back pain with sciatica   • Myofacial muscle pain   • Long term (current) use of opiate analgesic   • Smoking   • High risk medications (not anticoagulants) long-term use        Past Medical History:   Diagnosis Date   • Corneal FB (foreign body)     improved   • Hearing loss     for 30 years problems hearing high pitche dsounds.   • Measles    • Pneumonia     2016 dec        Past Surgical History:   Procedure Laterality Date   • EYE SURGERY  04/15/2016    remove foreign body cornea with slit lamp   • VASECTOMY                   PT Ortho       17 1600    ROM (Range of Motion)    General ROM --   B shld ROM WNL  -SP    General Head/Neck/Trunk Assessment    ROM Detail Cercvical L rotation 60, R rotation 66, Flexion 60 and ext 35  -SP      User Key  (r) = Recorded By, (t) = Taken By, (c) = Cosigned By    Initials Name Provider Type    LALY Dolan, PTA Physical Therapy Assistant                            PT Assessment/Plan       17 1630 17 1621    PT Assessment    Assessment Comments Tolerates cervical traction well audible t spine mobilization notes increaed tightness on L rotation  -SP Tolertes cervical static traction well ROM measures taken sent MD note with patient  -SP    PT Plan    PT Frequency 2x/week  -SP 2x/week  -SP    PT Plan Comments Continue with POC  -SP Continue with POC  -SP      User Key  (r) = Recorded By, (t) = Taken By, (c) =  Cosigned By    Initials Name Provider Type    LALY Dolan PTA Physical Therapy Assistant                    Exercises       08/02/17 1600          Subjective Comments    Subjective Comments Notes his mobility has improved some pain still present  -SP      Subjective Pain    Able to rate subjective pain? yes  -SP      Pre-Treatment Pain Level 3  -SP      Post-Treatment Pain Level 2  -SP      Aquatics    Aquatics performed? No  -SP      Exercise 1    Exercise Name 1 Pro 2 level 3  -SP      Time (Minutes) 1 10 min  -SP      Exercise 2    Exercise Name 2 Incline S, HS S  -SP      Sets 2 3  -SP      Time (Seconds) 2 30 sec  -SP        User Key  (r) = Recorded By, (t) = Taken By, (c) = Cosigned By    Initials Name Provider Type    LALY Dolan PTA Physical Therapy Assistant                               PT OP Goals       08/02/17 1630 08/02/17 1600    PT Short Term Goals    STG Date to Achieve  08/03/17  -SP    STG 1  Patient I with HEP  -SP    STG 1 Progress  Ongoing  -SP    STG 2  Improve lumbar spine AROM to minimal limit (all planes)  -SP    STG 2 Progress  Progressing  -SP    STG 3  Improve R L3 myotome strength (quads) to 5/5 level  -SP    STG 3 Progress  Ongoing  -SP    STG 4  Improve Modified Oswestry score to < or = 15/50   -SP    STG 4 Progress  Ongoing  -SP    Long Term Goals    LTG Date to Achieve  08/10/17  -SP    LTG 1  Improve lumbar spine AROM to WNL (all planes)  -SP    LTG 1 Progress  Ongoing  -SP    LTG 2  Improve Modified Oswestry score to < or = 10/50   -SP    LTG 2 Progress  Ongoing  -SP    LTG 3  Improve sitting tolerance to 1 hour with no increase in baseline lumbar spine pain  -SP    LTG 3 Progress  Ongoing  -SP    Time Calculation    PT Goal Re-Cert Due Date 08/07/17  -SP 08/10/17  -SP      User Key  (r) = Recorded By, (t) = Taken By, (c) = Cosigned By    Initials Name Provider Type    LALY Dolan PTA Physical Therapy Assistant                    Time Calculation:   Start  Time: 1518  Stop Time: 1630  Time Calculation (min): 72 min  Total Timed Code Minutes- PT: 60 minute(s)    Therapy Charges for Today     Code Description Service Date Service Provider Modifiers Qty    43609715506 HC PT THER SUPP EA 15 MIN 8/2/2017 Lala Dolan, PTA GP 1    23177215905 HC PT MANUAL THERAPY EA 15 MIN 8/2/2017 Lala Dolan, PTA GP 1    13939916792 HC PT THER PROC EA 15 MIN 8/2/2017 Lala Dolan PTA GP 1    94884210395 HC PT ELECTRICAL STIM UNATTENDED 8/2/2017 Lala Dolan PTA  1    37273313880 HC PT TRACTION CERVICAL 8/2/2017 Lala Dolan PTA GP 1                    Lala Dolan PTA  8/2/2017

## 2017-08-10 ENCOUNTER — HOSPITAL ENCOUNTER (OUTPATIENT)
Dept: PHYSICAL THERAPY | Facility: HOSPITAL | Age: 57
Setting detail: THERAPIES SERIES
Discharge: HOME | End: 2017-08-10

## 2017-08-10 DIAGNOSIS — M54.40 CHRONIC LOW BACK PAIN WITH SCIATICA, SCIATICA LATERALITY UNSPECIFIED, UNSPECIFIED BACK PAIN LATERALITY: Primary | ICD-10-CM

## 2017-08-10 DIAGNOSIS — G89.29 CHRONIC LOW BACK PAIN WITH SCIATICA, SCIATICA LATERALITY UNSPECIFIED, UNSPECIFIED BACK PAIN LATERALITY: Primary | ICD-10-CM

## 2017-08-10 PROCEDURE — 97110 THERAPEUTIC EXERCISES: CPT | Performed by: PHYSICAL THERAPIST

## 2017-08-10 PROCEDURE — 97113 AQUATIC THERAPY/EXERCISES: CPT | Performed by: PHYSICAL THERAPIST

## 2017-08-10 NOTE — THERAPY PROGRESS REPORT/RE-CERT
Outpatient Physical Therapy Ortho Progress Note  [facility]     Patient Name: Nancy Mcclure Sample  : 1960  MRN: [id number]  Today's Date: 8/10/2017      Visit Date: 08/10/2017      Visit 10/14  Recert date 17  MD visit 17  Some improvement        Visit Dx:    ICD-10-CM ICD-9-CM   1. Chronic low back pain with sciatica, sciatica laterality unspecified, unspecified back pain laterality M54.40 724.2    G89.29 724.3     338.29       Patient Active Problem List   Diagnosis   • Chronic low back pain with sciatica   • Myofacial muscle pain   • Long term (current) use of opiate analgesic   • Smoking   • High risk medications (not anticoagulants) long-term use        Past Medical History:   Diagnosis Date   • Corneal FB (foreign body)     improved   • Hearing loss     for 30 years problems hearing high pitche dsounds.   • Measles    • Pneumonia     2016 dec        Past Surgical History:   Procedure Laterality Date   • EYE SURGERY  04/15/2016    remove foreign body cornea with slit lamp   • VASECTOMY                   PT Ortho       08/10/17 1700    Posture/Observations    Alignment Options Forward head  -KW    Forward Head Mild;Moderate  -KW    Cervical Lordosis Increased  -KW    Thoracic Kyphosis Increased  -KW    Scapular Elevation Left:;Moderate  -KW    Lumbar lordosis Decreased  -KW    Posture/Observations Comments kyphotic thoracic spine, absent lumbar lordosis, forward head posture  -KW    Neural Tension Signs- Lower Quarter Clearing    Slump Negative  -KW    SLR Negative  -KW    Lumbar ROM Screen- Lower Quarter Clearing    Lumbar Flexion Normal  -KW    Lumbar Extension Normal  -KW    Lumbar Lateral Flexion Normal  -KW    Lumbar Rotation Normal  -KW    Lumbosacral Palpation    SI Bilateral:  -KW    Lumbosacral Segment Right:;Tender  -KW    Spinous Process Tender  -KW    Piriformis Bilateral:;Tender  -KW    ROM (Range of Motion)    General ROM Detail all lumbar ROM WNL but painful at end  range  -KW    Balance Skills Training    Sitting # of Minutes 30 min  -KW    Standing Balance # of Minutes 30 min  -KW    Gait Assessment/Treatment    Gait, Comment normal gait  -KW      User Key  (r) = Recorded By, (t) = Taken By, (c) = Cosigned By    Initials Name Provider Type    KW Kim Cassidy, PT Physical Therapist                            PT Assessment/Plan       08/10/17 0550       PT Assessment    Functional Limitations Performance in work activities;Limitations in community activities;Performance in leisure activities  -KW     Impairments Balance;Muscle strength;Pain;Joint mobility;Joint integrity;Poor body mechanics;Posture;Sensation  -KW     Assessment Comments tolerated all exercises pain free, some decrease in LBP with deep hang inaquatics  -KW     Please refer to paper survey for additional self-reported information No  -KW     Rehab Potential Good  -KW     Patient/caregiver participated in establishment of treatment plan and goals Yes  -KW     Patient would benefit from skilled therapy intervention Yes  -KW     PT Plan    PT Frequency 2x/week;1x/week  -KW     Predicted Duration of Therapy Intervention (days/wks) 4 weeks  -KW     Planned CPT's? PT EVAL MOD COMPLELITY: 15065;PT RE-EVAL: 39230;PT THER PROC EA 15 MIN: 63573;PT NEUROMUSC RE-EDUCATION EA 15 MIN: 60392;PT THER ACT EA 15 MIN: 17538;PT AQUATIC THERAPY EA 15 MIN: 49436;PT THER SUPP EA 15 MIN;PT MANUAL THERAPY EA 15 MIN: 64968;PT ELECTRICAL STIM UNATTEND:   -KW     Physical Therapy Interventions (Optional Details) aquatics exercise;lumbar stabilization;balance training;ROM (Range of Motion);strengthening;stretching;modalities;manual therapy techniques;swiss ball techniques;neuromuscular re-education;gross motor skills;home exercise program;joint mobilization;postural re-education;patient/family education  -KW     PT Plan Comments Will cont POC 1x per week every other week then possible D/C in 2 more visits  -KW       User Key  (r) =  Recorded By, (t) = Taken By, (c) = Cosigned By    Initials Name Provider Type    USMAN Cassidy, PT Physical Therapist                    Exercises       08/10/17 1700          Subjective Comments    Subjective Comments Reports he fells about the same  -KW      Subjective Pain    Able to rate subjective pain? yes  -KW      Pre-Treatment Pain Level 3  -KW      Post-Treatment Pain Level 2  -KW      Exercise 1    Exercise Name 1 Pro 2 level 3  -KW      Time (Minutes) 1 10  -KW      Exercise 2    Exercise Name 2 aqua: walking  -KW      Time (Minutes) 2 15  -KW      Exercise 3    Exercise Name 3 Deep hang  -KW      Time (Minutes) 3 15  -KW        User Key  (r) = Recorded By, (t) = Taken By, (c) = Cosigned By    Initials Name Provider Type    USMAN Cassidy, PT Physical Therapist                               PT OP Goals       08/10/17 1700       PT Short Term Goals    STG Date to Achieve 08/24/17  -KW     STG 1 Patient I with HEP  -KW     STG 1 Progress Met  -KW     STG 2 Improve lumbar spine AROM to minimal limit (all planes)  -KW     STG 2 Progress Progressing  -KW     STG 3 Improve R L3 myotome strength (quads) to 5/5 level  -KW     STG 3 Progress Ongoing  -KW     STG 4 Improve Modified Oswestry score to < or = 15/50   -KW     STG 4 Progress Ongoing  -KW     Long Term Goals    LTG Date to Achieve 08/31/17  -KW     LTG 1 Improve lumbar spine AROM to WNL (all planes)  -KW     LTG 1 Progress Partially Met  -KW     LTG 2 Improve Modified Oswestry score to < or = 10/50   -KW     LTG 2 Progress Ongoing  -KW     LTG 3 Improve sitting tolerance to 1 hour with no increase in baseline lumbar spine pain  -KW     LTG 3 Progress Ongoing  -KW     Time Calculation    PT Goal Re-Cert Due Date 08/31/17  -KW       User Key  (r) = Recorded By, (t) = Taken By, (c) = Cosigned By    Initials Name Provider Type    USMAN Cassidy, TERRI Physical Therapist                Therapy Education       08/10/17 1701          Therapy  Education    Given HEP  -KW      Program Reinforced  -KW      How Provided Verbal  -KW      Provided to Patient  -KW      Level of Understanding Verbalized  -KW        User Key  (r) = Recorded By, (t) = Taken By, (c) = Cosigned By    Initials Name Provider Type    USMAN Cassidy, PT Physical Therapist                Outcome Measures       08/10/17 1700          Modified Oswestry    Modified Oswestry Score/Comments 40%  -KW      Functional Assessment    Outcome Measure Options Modifed Owestry  -KW        User Key  (r) = Recorded By, (t) = Taken By, (c) = Cosigned By    Initials Name Provider Type    USMAN Cassidy, PT Physical Therapist            Time Calculation:   Start Time: 1700  Stop Time: 1804  Time Calculation (min): 64 min  Total Timed Code Minutes- PT: 64 minute(s)    Therapy Charges for Today     Code Description Service Date Service Provider Modifiers Qty    86088292064 HC PT THER PROC EA 15 MIN 8/10/2017 Kim Cassidy, PT GP 1    60166407444 HC PT AQUATIC THERAPY EA 15 MIN 8/10/2017 Kim Cassidy, PT GP 3          PT G-Codes  Outcome Measure Options: Modifed Owestry         Kim Cassidy, PT  8/10/2017

## 2017-08-24 ENCOUNTER — HOSPITAL ENCOUNTER (OUTPATIENT)
Dept: PHYSICAL THERAPY | Facility: HOSPITAL | Age: 57
Setting detail: THERAPIES SERIES
Discharge: HOME | End: 2017-08-24

## 2017-08-24 DIAGNOSIS — M54.40 CHRONIC LOW BACK PAIN WITH SCIATICA, SCIATICA LATERALITY UNSPECIFIED, UNSPECIFIED BACK PAIN LATERALITY: Primary | ICD-10-CM

## 2017-08-24 DIAGNOSIS — G89.29 CHRONIC LOW BACK PAIN WITH SCIATICA, SCIATICA LATERALITY UNSPECIFIED, UNSPECIFIED BACK PAIN LATERALITY: Primary | ICD-10-CM

## 2017-08-24 PROCEDURE — 97140 MANUAL THERAPY 1/> REGIONS: CPT

## 2017-08-24 PROCEDURE — 97110 THERAPEUTIC EXERCISES: CPT

## 2017-08-24 NOTE — THERAPY TREATMENT NOTE
Outpatient Physical Therapy Ortho Treatment Note  [facility]     Patient Name: Nancy Mcclure Sample  : 1960  MRN: [id number]  Today's Date: 2017      Visit Date: 2017  Subjective Improvement: 75%  Visit Number: 11/15  Insurance approval: 2 visits left  MD Visit: PRN  Recert Date: 2017      Therapy Diagnosis: Chronic low back pain  Visit Dx:    ICD-10-CM ICD-9-CM   1. Chronic low back pain with sciatica, sciatica laterality unspecified, unspecified back pain laterality M54.40 724.2    G89.29 724.3     338.29       Patient Active Problem List   Diagnosis   • Chronic low back pain with sciatica   • Myofacial muscle pain   • Long term (current) use of opiate analgesic   • Smoking   • High risk medications (not anticoagulants) long-term use        Past Medical History:   Diagnosis Date   • Corneal FB (foreign body)     improved   • Hearing loss     for 30 years problems hearing high pitche dsounds.   • Measles    • Pneumonia     2016 dec        Past Surgical History:   Procedure Laterality Date   • EYE SURGERY  04/15/2016    remove foreign body cornea with slit lamp   • VASECTOMY                   PT Ortho       17 1605    Subjective Pain    Able to rate subjective pain? yes  -AK    Pre-Treatment Pain Level 3  -AK    Post-Treatment Pain Level 3  -AK      User Key  (r) = Recorded By, (t) = Taken By, (c) = Cosigned By    Initials Name Provider Type    CARMELA Mccarthy, PT Physical Therapist                            PT Assessment/Plan       17 0392       PT Assessment    Assessment Comments Pt presents with shortened quadriceps, hamstrings and psoas muscles bilaterally-R sciatic nerve tension test postiive as well. Pt may benefit from aquatic therapy next session more than land based due to diffuse symptoms and hypertonicity in lumbar musculature.  -AK       User Key  (r) = Recorded By, (t) = Taken By, (c) = Cosigned By    Initials Name Provider Type    CARMELA Mccarthy  PT Physical Therapist                Modalities       08/24/17 1605          Moist Heat    MH Applied Yes  -AK      Location lower back during stretches  -AK      Rx Minutes 10 mins  -AK        User Key  (r) = Recorded By, (t) = Taken By, (c) = Cosigned By    Initials Name Provider Type    CARMELA Mccarthy, PT Physical Therapist                Exercises       08/24/17 1605          Subjective Pain    Able to rate subjective pain? yes  -AK      Pre-Treatment Pain Level 3  -AK      Post-Treatment Pain Level 3  -AK      Exercise 1    Exercise Name 1 Pro 2 level 3 x8 minutes  -AK      Exercise 2    Exercise Name 2 gastrocnemius and soleus stretches on babay bear 3x30 second hold each  -AK        User Key  (r) = Recorded By, (t) = Taken By, (c) = Cosigned By    Initials Name Provider Type    CARMELA Mccarthy, PT Physical Therapist                        Manual Rx (last 36 hours)      Manual Treatments       08/24/17 1605          Manual Rx 1    Manual Rx 1 Location Cupping to Lumbar spine musculature  -AK      Manual Rx 2    Manual Rx 2 Location manual quadriceps, iliopsoas stretches bilaterally  -AK      Manual Rx 3    Manual Rx 3 Location R Sciatic Nerve mobilizations  -AK        User Key  (r) = Recorded By, (t) = Taken By, (c) = Cosigned By    Initials Name Provider Type    CARMELA Mccarthy, PT Physical Therapist                PT OP Goals       08/24/17 1605       PT Short Term Goals    STG Date to Achieve 08/24/17  -AK     STG 1 Patient I with HEP  -AK     STG 1 Progress Met  -AK     STG 2 Improve lumbar spine AROM to minimal limit (all planes)  -AK     STG 2 Progress Progressing  -AK     STG 3 Improve R L3 myotome strength (quads) to 5/5 level  -AK     STG 3 Progress Ongoing  -AK     STG 4 Improve Modified Oswestry score to < or = 15/50   -AK     STG 4 Progress Ongoing  -AK     Long Term Goals    LTG Date to Achieve 08/31/17  -AK     LTG 1 Improve lumbar spine AROM to WNL (all planes)  -AK     LTG 1 Progress  Partially Met  -AK     LTG 2 Improve Modified Oswestry score to < or = 10/50   -AK     LTG 2 Progress Ongoing  -AK     LTG 3 Improve sitting tolerance to 1 hour with no increase in baseline lumbar spine pain  -AK     LTG 3 Progress Ongoing  -AK       User Key  (r) = Recorded By, (t) = Taken By, (c) = Cosigned By    Initials Name Provider Type    CARMELA Mccarthy, PT Physical Therapist                    Time Calculation:   Start Time: 1605  Stop Time: 1645  Time Calculation (min): 40 min  Total Timed Code Minutes- PT: 40 minute(s)    Therapy Charges for Today     Code Description Service Date Service Provider Modifiers Qty    45428924295 HC PT THER SUPP EA 15 MIN 8/24/2017 Max Mccarthy, PT GP 1    89902583728 HC PT THER PROC EA 15 MIN 8/24/2017 Max Mccarthy, PT GP 1    97824650647 HC PT MANUAL THERAPY EA 15 MIN 8/24/2017 Max Mccarthy, PT GP 2                    Max Mccarthy PT  8/24/2017

## 2017-09-05 ENCOUNTER — OFFICE VISIT (OUTPATIENT)
Dept: PAIN MEDICINE | Facility: CLINIC | Age: 57
End: 2017-09-05

## 2017-09-05 VITALS
WEIGHT: 128.7 LBS | DIASTOLIC BLOOD PRESSURE: 80 MMHG | SYSTOLIC BLOOD PRESSURE: 126 MMHG | BODY MASS INDEX: 19.51 KG/M2 | HEIGHT: 68 IN

## 2017-09-05 DIAGNOSIS — M54.40 CHRONIC LOW BACK PAIN WITH SCIATICA, SCIATICA LATERALITY UNSPECIFIED, UNSPECIFIED BACK PAIN LATERALITY: Primary | ICD-10-CM

## 2017-09-05 DIAGNOSIS — Z72.0 SMOKING TRYING TO QUIT: ICD-10-CM

## 2017-09-05 DIAGNOSIS — Z79.899 HIGH RISK MEDICATIONS (NOT ANTICOAGULANTS) LONG-TERM USE: ICD-10-CM

## 2017-09-05 DIAGNOSIS — M79.18 MYOFACIAL MUSCLE PAIN: ICD-10-CM

## 2017-09-05 DIAGNOSIS — G89.29 CHRONIC LOW BACK PAIN WITH SCIATICA, SCIATICA LATERALITY UNSPECIFIED, UNSPECIFIED BACK PAIN LATERALITY: Primary | ICD-10-CM

## 2017-09-05 PROCEDURE — 99213 OFFICE O/P EST LOW 20 MIN: CPT | Performed by: PAIN MEDICINE

## 2017-09-05 NOTE — PROGRESS NOTES
Nancy Mcclure Sample is a 57 y.o. male.   1960    HPI:   Location: lower back  Quality: aching and throbbing  Severity: 2/10  Timing: constant  Alleviating: pain medication  Aggravating: ambulating  and increased activity    Pt with no change in chronic low back pain. Works 5-6 days aweek. Opiate medications provides enough relief for daily activity and ambulation. NO SE. Pt happy with pain management visit and regimen.         The following portions of the patient's history were reviewed by me and updated as appropriate: allergies, current medications, past family history, past medical history, past social history, past surgical history and problem list.    Past Medical History:   Diagnosis Date   • Corneal FB (foreign body)     improved   • Hearing loss     for 30 years problems hearing high pitche dsounds.   • Measles    • Pneumonia     2016 dec       Social History     Social History   • Marital status:      Spouse name: N/A   • Number of children: N/A   • Years of education: N/A     Occupational History   • Not on file.     Social History Main Topics   • Smoking status: Heavy Tobacco Smoker     Packs/day: 1.00     Types: Cigarettes   • Smokeless tobacco: Never Used      Comment: Smoking Cessation discussed   • Alcohol use Yes      Comment: ocassionally   • Drug use: No   • Sexual activity: Defer     Other Topics Concern   • Not on file     Social History Narrative       Family History   Problem Relation Age of Onset   • Cancer Mother    • Cancer Father          Current Outpatient Prescriptions:   •  albuterol (PROVENTIL HFA;VENTOLIN HFA) 108 (90 BASE) MCG/ACT inhaler, Inhale 2 puffs Every 6 (Six) Hours., Disp: , Rfl:   •  carisoprodol (SOMA) 350 MG tablet, Take 350 mg by mouth 4 (Four) Times a Day., Disp: , Rfl:   •  carisoprodol (SOMA) 350 MG tablet, Take 1 tablet by mouth 2 (Two) Times a Day., Disp: 60 tablet, Rfl: 1  •  carisoprodol (SOMA) 350 MG tablet, Take 350 mg by mouth 4 (Four) Times a Day.,  Disp: , Rfl:   •  HYDROcodone-acetaminophen (NORCO)  MG per tablet, , Disp: , Rfl:   •  HYDROcodone-acetaminophen (NORCO)  MG per tablet, , Disp: , Rfl:   •  HYDROcodone-GuaiFENesin (NARCOF PO), Take  by mouth., Disp: , Rfl:   •  Multiple Vitamins-Minerals (CENTRUM SILVER ADULT 50+ PO), Take  by mouth., Disp: , Rfl:   •  pseudoephedrine-guaifenesin (MUCINEX D)  MG per 12 hr tablet, , Disp: , Rfl: 0  •  VENTOLIN  (90 BASE) MCG/ACT inhaler, , Disp: , Rfl: 0    No Known Allergies    Review of Systems   Musculoskeletal: Positive for back pain (lower).   All other systems reviewed and are negative.    All systems reviewed and negative except for above.    Physical Exam   Constitutional: He appears well-developed and well-nourished. No distress.   HENT:   Head: Normocephalic and atraumatic.   Eyes: Pupils are equal, round, and reactive to light.   Neck: Full passive range of motion without pain. No spinous process tenderness and no muscular tenderness present. Normal range of motion present.   Cardiovascular: Normal rate and regular rhythm.    Pulmonary/Chest: Effort normal. No respiratory distress.   Musculoskeletal:        Lumbar back: He exhibits decreased range of motion ( flex < 100 deg and 15 deg with FL right sided mainly) and tenderness (midline TTP).   Neurological: He is alert. He displays no tremor and normal reflexes. He exhibits normal muscle tone. He displays no seizure activity. Coordination and gait normal.   Reflex Scores:       Tricep reflexes are 2+ on the right side and 2+ on the left side.       Bicep reflexes are 2+ on the right side and 2+ on the left side.       Brachioradialis reflexes are 2+ on the right side and 2+ on the left side.       Patellar reflexes are 1+ on the right side and 1+ on the left side.       Achilles reflexes are 1+ on the right side and 1+ on the left side.  SLR right slight     Skin: Skin is warm, dry and intact. No rash noted. He is not  diaphoretic.   Psychiatric: He has a normal mood and affect. His behavior is normal. Judgment normal. He expresses no homicidal and no suicidal ideation. He expresses no suicidal plans and no homicidal plans.   Nursing note and vitals reviewed.      Nancy was seen today for back pain.    Diagnoses and all orders for this visit:    Chronic low back pain with sciatica, sciatica laterality unspecified, unspecified back pain laterality    Myofacial muscle pain    Smoking trying to quit    High risk medications (not anticoagulants) long-term use      Medication: Patient reports no negative side effects, Patient reports appropriate usage and storage habits and Patient's opioid provides enough reflief to be more active and perform activities of daily living with less discomfort.Soma 350mg bid and norco 10mg qid. Discussed case with Volodymyr Mesa, opiate medication refilled ×3 hand written scripts.      Interventional:  I have discussed smoking cessation and plan with patient, unsure of compliance. ISABEL and any neurological impairments discussed with patient that may need of emergency evaluation.      Rehab: works 6 days a week.     Behavioral: No aberrant behavior noted. SARAH Report # [id number]  was reviewed and is consistent with stated history    Urine drug screen None at this time- UDS next visit.           This document has been electronically signed by RAHEEM Alvarado on September 5, 2017 4:40 PM          This document has been electronically signed by RAHEEM Alvarado on September 5, 2017 4:40 PM

## 2017-09-07 ENCOUNTER — HOSPITAL ENCOUNTER (OUTPATIENT)
Dept: PHYSICAL THERAPY | Facility: HOSPITAL | Age: 57
Setting detail: THERAPIES SERIES
End: 2017-09-07

## 2017-09-07 DIAGNOSIS — G89.29 CHRONIC LOW BACK PAIN WITH SCIATICA, SCIATICA LATERALITY UNSPECIFIED, UNSPECIFIED BACK PAIN LATERALITY: Primary | ICD-10-CM

## 2017-09-07 DIAGNOSIS — M54.40 CHRONIC LOW BACK PAIN WITH SCIATICA, SCIATICA LATERALITY UNSPECIFIED, UNSPECIFIED BACK PAIN LATERALITY: Primary | ICD-10-CM

## 2017-11-07 ENCOUNTER — OFFICE VISIT (OUTPATIENT)
Dept: PAIN MEDICINE | Facility: CLINIC | Age: 57
End: 2017-11-07

## 2017-11-07 ENCOUNTER — APPOINTMENT (OUTPATIENT)
Dept: LAB | Facility: HOSPITAL | Age: 57
End: 2017-11-07

## 2017-11-07 VITALS
DIASTOLIC BLOOD PRESSURE: 88 MMHG | WEIGHT: 124.9 LBS | HEIGHT: 68 IN | SYSTOLIC BLOOD PRESSURE: 124 MMHG | BODY MASS INDEX: 18.93 KG/M2

## 2017-11-07 DIAGNOSIS — G89.29 CHRONIC LOW BACK PAIN, UNSPECIFIED BACK PAIN LATERALITY, WITH SCIATICA PRESENCE UNSPECIFIED: ICD-10-CM

## 2017-11-07 DIAGNOSIS — M54.5 CHRONIC LOW BACK PAIN, UNSPECIFIED BACK PAIN LATERALITY, WITH SCIATICA PRESENCE UNSPECIFIED: ICD-10-CM

## 2017-11-07 DIAGNOSIS — Z79.899 HIGH RISK MEDICATIONS (NOT ANTICOAGULANTS) LONG-TERM USE: Primary | ICD-10-CM

## 2017-11-07 DIAGNOSIS — M79.18 MYOFACIAL MUSCLE PAIN: ICD-10-CM

## 2017-11-07 DIAGNOSIS — M47.817 LUMBOSACRAL SPONDYLOSIS WITHOUT MYELOPATHY: ICD-10-CM

## 2017-11-07 DIAGNOSIS — Z72.0 SMOKING TRYING TO QUIT: ICD-10-CM

## 2017-11-07 PROCEDURE — 80307 DRUG TEST PRSMV CHEM ANLYZR: CPT | Performed by: NURSE PRACTITIONER

## 2017-11-07 PROCEDURE — G0481 DRUG TEST DEF 8-14 CLASSES: HCPCS | Performed by: NURSE PRACTITIONER

## 2017-11-07 PROCEDURE — 99214 OFFICE O/P EST MOD 30 MIN: CPT | Performed by: PAIN MEDICINE

## 2017-11-07 NOTE — PROGRESS NOTES
Nancy Mcclure Sample is a 57 y.o. male.   1960    HPI:   Location: lower back  Quality: aching and throbbing  Severity: 2/10  Timing: constant  Alleviating: pain medication  Aggravating: ambulating  and increased activity    Pt with chronic lower back pain- works 5 days a week. Opiate medications provides enough relief for daily activity and ambulation. NO SE. Pt happy with pain management visit and regimen.         The following portions of the patient's history were reviewed by me and updated as appropriate: allergies, current medications, past family history, past medical history, past social history, past surgical history and problem list.    Past Medical History:   Diagnosis Date   • Corneal FB (foreign body)     improved   • Hearing loss     for 30 years problems hearing high pitche dsounds.   • Measles    • Pneumonia     2016 dec       Social History     Social History   • Marital status:      Spouse name: N/A   • Number of children: N/A   • Years of education: N/A     Occupational History   • Not on file.     Social History Main Topics   • Smoking status: Heavy Tobacco Smoker     Packs/day: 1.00     Types: Cigarettes   • Smokeless tobacco: Never Used      Comment: Smoking Cessation discussed   • Alcohol use Yes      Comment: ocassionally   • Drug use: No   • Sexual activity: Defer     Other Topics Concern   • Not on file     Social History Narrative       Family History   Problem Relation Age of Onset   • Cancer Mother    • Cancer Father          Current Outpatient Prescriptions:   •  albuterol (PROVENTIL HFA;VENTOLIN HFA) 108 (90 BASE) MCG/ACT inhaler, Inhale 2 puffs Every 6 (Six) Hours., Disp: , Rfl:   •  carisoprodol (SOMA) 350 MG tablet, Take 350 mg by mouth 4 (Four) Times a Day., Disp: , Rfl:   •  carisoprodol (SOMA) 350 MG tablet, Take 1 tablet by mouth 2 (Two) Times a Day., Disp: 60 tablet, Rfl: 1  •  carisoprodol (SOMA) 350 MG tablet, Take 350 mg by mouth 4 (Four) Times a Day., Disp: ,  Rfl:   •  HYDROcodone-acetaminophen (NORCO)  MG per tablet, , Disp: , Rfl:   •  HYDROcodone-acetaminophen (NORCO)  MG per tablet, , Disp: , Rfl:   •  HYDROcodone-GuaiFENesin (NARCOF PO), Take  by mouth., Disp: , Rfl:   •  Multiple Vitamins-Minerals (CENTRUM SILVER ADULT 50+ PO), Take  by mouth., Disp: , Rfl:   •  pseudoephedrine-guaifenesin (MUCINEX D)  MG per 12 hr tablet, , Disp: , Rfl: 0  •  VENTOLIN  (90 BASE) MCG/ACT inhaler, , Disp: , Rfl: 0    No Known Allergies    Review of Systems   Musculoskeletal: Positive for back pain (lower).   All other systems reviewed and are negative.    All systems reviewed and negative except for above.    Physical Exam   Constitutional: He appears well-developed and well-nourished. No distress.   HENT:   Head: Normocephalic and atraumatic.   Eyes: Pupils are equal, round, and reactive to light.   Neck: Full passive range of motion without pain. No spinous process tenderness and no muscular tenderness present. Normal range of motion present.   Cardiovascular: Normal rate and regular rhythm.    Pulmonary/Chest: Effort normal. No respiratory distress.   Musculoskeletal:        Lumbar back: He exhibits decreased range of motion ( flex < 100 deg and 15 deg with facet loading bilateral) and tenderness (muscle midline ttp).   Neurological: He is alert. He displays normal reflexes. No sensory deficit. He exhibits normal muscle tone. Coordination and gait normal.   Reflex Scores:       Patellar reflexes are 1+ on the right side and 1+ on the left side.       Achilles reflexes are 1+ on the right side and 1+ on the left side.        Skin: Skin is warm, dry and intact. No rash noted. He is not diaphoretic.   Psychiatric: He has a normal mood and affect. His behavior is normal. Judgment normal. He expresses no homicidal and no suicidal ideation. He expresses no suicidal plans and no homicidal plans.   Nursing note and vitals reviewed.      Nancy was seen today for  back pain.    Diagnoses and all orders for this visit:    High risk medications (not anticoagulants) long-term use  -     ToxASSURE Select 13 (MW) - Urine, Clean Catch    Chronic low back pain, unspecified back pain laterality, with sciatica presence unspecified    Myofacial muscle pain    Smoking trying to quit    Lumbosacral spondylosis without myelopathy        Medication: Patient reports no negative side effects, Patient reports appropriate usage and storage habits and Patient's opioid provides enough relief to be more active and perform activities of daily living with less discomfort. Soma 350mg bid and Norco 10mg qid. Discussed case with Volodymyr Mesa, opiate medication refilled ×3 hand written scripts.      Interventional:  I have discussed smoking cessation and plan with patient, unsure of compliance. ISABEL and any neurological impairments discussed with patient that may need of emergency evaluation.        Rehab: works 5 days a week.     Behavioral: No aberrant behavior noted. SARAH Report # [id number]  was reviewed and is consistent with stated history    Urine drug screen Ordered today to test for drugs of abuse and prescribed medications          This document has been electronically signed by RAHEEM Alvarado on November 8, 2017 6:23 PM          This document has been electronically signed by RAHEEM Alvarado on November 8, 2017 6:23 PM

## 2017-11-15 LAB — CONV REPORT SUMMARY: NORMAL

## 2017-12-13 ENCOUNTER — OFFICE VISIT (OUTPATIENT)
Dept: PAIN MEDICINE | Facility: CLINIC | Age: 57
End: 2017-12-13

## 2017-12-13 VITALS
HEIGHT: 68 IN | WEIGHT: 126.9 LBS | BODY MASS INDEX: 19.23 KG/M2 | DIASTOLIC BLOOD PRESSURE: 72 MMHG | SYSTOLIC BLOOD PRESSURE: 132 MMHG

## 2017-12-13 DIAGNOSIS — Z79.891 LONG TERM (CURRENT) USE OF OPIATE ANALGESIC: ICD-10-CM

## 2017-12-13 DIAGNOSIS — M54.5 CHRONIC LOW BACK PAIN, UNSPECIFIED BACK PAIN LATERALITY, WITH SCIATICA PRESENCE UNSPECIFIED: Primary | ICD-10-CM

## 2017-12-13 DIAGNOSIS — Z79.899 HIGH RISK MEDICATIONS (NOT ANTICOAGULANTS) LONG-TERM USE: ICD-10-CM

## 2017-12-13 DIAGNOSIS — F17.200 SMOKING: ICD-10-CM

## 2017-12-13 DIAGNOSIS — M79.18 MYOFACIAL MUSCLE PAIN: ICD-10-CM

## 2017-12-13 DIAGNOSIS — G89.29 CHRONIC LOW BACK PAIN, UNSPECIFIED BACK PAIN LATERALITY, WITH SCIATICA PRESENCE UNSPECIFIED: Primary | ICD-10-CM

## 2017-12-13 PROCEDURE — 99214 OFFICE O/P EST MOD 30 MIN: CPT | Performed by: PAIN MEDICINE

## 2017-12-13 NOTE — PROGRESS NOTES
Nancy Mcclure Sample is a 57 y.o. male.   1960    HPI:   Location: lower back  Quality: aching and throbbing  Severity: 3/10  Timing: constant  Alleviating: pain medication  Aggravating: ambulating  and increased activity    Pt reports cold weather causes more joint pains- encouraged to use motrin prn as directed to supplement pain medications. Opiate medications provides enough relief for daily activity and ambulation. NO SE. Pt happy with pain management visit and regimen.         The following portions of the patient's history were reviewed by me and updated as appropriate: allergies, current medications, past family history, past medical history, past social history, past surgical history and problem list.    Past Medical History:   Diagnosis Date   • Corneal FB (foreign body)     improved   • Hearing loss     for 30 years problems hearing high pitche dsounds.   • Measles    • Pneumonia     2016 dec       Social History     Social History   • Marital status:      Spouse name: N/A   • Number of children: N/A   • Years of education: N/A     Occupational History   • Not on file.     Social History Main Topics   • Smoking status: Heavy Tobacco Smoker     Packs/day: 1.00     Types: Cigarettes   • Smokeless tobacco: Never Used      Comment: Smoking Cessation discussed   • Alcohol use Yes      Comment: ocassionally   • Drug use: No   • Sexual activity: Defer     Other Topics Concern   • Not on file     Social History Narrative       Family History   Problem Relation Age of Onset   • Cancer Mother    • Cancer Father          Current Outpatient Prescriptions:   •  carisoprodol (SOMA) 350 MG tablet, Take 350 mg by mouth 4 (Four) Times a Day., Disp: , Rfl:   •  carisoprodol (SOMA) 350 MG tablet, Take 1 tablet by mouth 2 (Two) Times a Day., Disp: 60 tablet, Rfl: 1  •  carisoprodol (SOMA) 350 MG tablet, Take 350 mg by mouth 4 (Four) Times a Day., Disp: , Rfl:   •  HYDROcodone-acetaminophen (NORCO)  MG per  tablet, , Disp: , Rfl:   •  HYDROcodone-acetaminophen (NORCO)  MG per tablet, , Disp: , Rfl:   •  HYDROcodone-GuaiFENesin (NARCOF PO), Take  by mouth., Disp: , Rfl:   •  Multiple Vitamins-Minerals (CENTRUM SILVER ADULT 50+ PO), Take  by mouth., Disp: , Rfl:   •  pseudoephedrine-guaifenesin (MUCINEX D)  MG per 12 hr tablet, , Disp: , Rfl: 0  •  VENTOLIN  (90 BASE) MCG/ACT inhaler, , Disp: , Rfl: 0    No Known Allergies    Review of Systems   Musculoskeletal: Positive for back pain (lower).   All other systems reviewed and are negative.    All systems reviewed and negative except for above.    Physical Exam   Constitutional: He appears well-developed and well-nourished. No distress.   HENT:   Head: Normocephalic and atraumatic.   Neck: Full passive range of motion without pain. No spinous process tenderness and no muscular tenderness present. Normal range of motion present.   Cardiovascular: Normal rate and regular rhythm.    Pulmonary/Chest: Effort normal. No respiratory distress.   Musculoskeletal:        Lumbar back: He exhibits decreased range of motion (Flex 90 deg and 15 deg with right sided facet loading) and tenderness (midline TTP ).   Neurological: He is alert. He displays no tremor and normal reflexes. He exhibits normal muscle tone. He displays no seizure activity. Coordination and gait normal.   Reflex Scores:       Patellar reflexes are 1+ on the right side and 1+ on the left side.       Achilles reflexes are 1+ on the right side and 1+ on the left side.  SLR right mildly     Skin: Skin is warm, dry and intact. No rash noted. He is not diaphoretic.   Psychiatric: He has a normal mood and affect. His behavior is normal. Judgment normal. He expresses no homicidal and no suicidal ideation. He expresses no suicidal plans and no homicidal plans.   Nursing note and vitals reviewed.      Nancy was seen today for back pain.    Diagnoses and all orders for this visit:    Chronic low back  pain, unspecified back pain laterality, with sciatica presence unspecified    Myofacial muscle pain    Long term (current) use of opiate analgesic    Smoking    High risk medications (not anticoagulants) long-term use        Medication: Patient reports no negative side effects, Patient reports appropriate usage and storage habits and Patient's opioid provides enough relief to be more active and perform activities of daily living with less discomfort. Norco 10mg qid and Soma 350mg bid. Discussed case with Volodymyr Mesa, opiate medication refilled ×2 hand written scripts. Pt uses motrin prn otc to supplement during cold weather.       Interventional: Smoking cessation discussion was given, but I am unsure of the compliance. ISABEL and any neurological impairments discussed with patient that may need of emergency evaluation.      Rehab: Works 5 days a week.     Behavioral: No aberrant behavior noted. SARHA Report # [id number]  was reviewed and is consistent with stated history    Urine drug screen Reviewed from last visit and is appropriate          This document has been electronically signed by RAHEEM Alvarado on December 13, 2017 10:36 AM          This document has been electronically signed by RAHEEM Alvarado on December 13, 2017 10:36 AM

## 2020-01-01 ENCOUNTER — HOSPITAL ENCOUNTER (EMERGENCY)
Facility: HOSPITAL | Age: 60
End: 2020-08-10
Attending: STUDENT IN AN ORGANIZED HEALTH CARE EDUCATION/TRAINING PROGRAM

## 2020-01-01 ENCOUNTER — ANESTHESIA (OUTPATIENT)
Dept: GASTROENTEROLOGY | Facility: HOSPITAL | Age: 60
End: 2020-01-01

## 2020-01-01 ENCOUNTER — LAB (OUTPATIENT)
Dept: LAB | Facility: HOSPITAL | Age: 60
End: 2020-01-01

## 2020-01-01 ENCOUNTER — HOSPITAL ENCOUNTER (EMERGENCY)
Facility: HOSPITAL | Age: 60
End: 2020-08-08
Admitting: STUDENT IN AN ORGANIZED HEALTH CARE EDUCATION/TRAINING PROGRAM

## 2020-01-01 ENCOUNTER — ANESTHESIA EVENT (OUTPATIENT)
Dept: GASTROENTEROLOGY | Facility: HOSPITAL | Age: 60
End: 2020-01-01

## 2020-01-01 ENCOUNTER — HOSPITAL ENCOUNTER (OUTPATIENT)
Facility: HOSPITAL | Age: 60
Setting detail: HOSPITAL OUTPATIENT SURGERY
Discharge: HOME | End: 2020-07-30
Attending: INTERNAL MEDICINE | Admitting: INTERNAL MEDICINE

## 2020-01-01 VITALS
DIASTOLIC BLOOD PRESSURE: 60 MMHG | RESPIRATION RATE: 18 BRPM | OXYGEN SATURATION: 98 % | WEIGHT: 116 LBS | HEART RATE: 78 BPM | SYSTOLIC BLOOD PRESSURE: 98 MMHG | TEMPERATURE: 96.7 F | HEIGHT: 68 IN | BODY MASS INDEX: 17.58 KG/M2

## 2020-01-01 VITALS
TEMPERATURE: 97.4 F | HEART RATE: 110 BPM | SYSTOLIC BLOOD PRESSURE: 93 MMHG | DIASTOLIC BLOOD PRESSURE: 26 MMHG | OXYGEN SATURATION: 100 %

## 2020-01-01 DIAGNOSIS — Z86.010 PERSONAL HISTORY OF COLONIC POLYPS: ICD-10-CM

## 2020-01-01 DIAGNOSIS — R41.89 UNRESPONSIVE: ICD-10-CM

## 2020-01-01 DIAGNOSIS — I46.9 CARDIAC ARREST (HCC): Primary | ICD-10-CM

## 2020-01-01 LAB
COVID LABCORP PRIORITY: NORMAL
LAB AP CASE REPORT: NORMAL
PATH REPORT.FINAL DX SPEC: NORMAL
SARS-COV-2 RNA RESP QL NAA+PROBE: NOT DETECTED

## 2020-01-01 PROCEDURE — C9803 HOPD COVID-19 SPEC COLLECT: HCPCS | Performed by: INTERNAL MEDICINE

## 2020-01-01 PROCEDURE — 99283 EMERGENCY DEPT VISIT LOW MDM: CPT

## 2020-01-01 PROCEDURE — 25010000002 PROPOFOL 10 MG/ML EMULSION: Performed by: NURSE ANESTHETIST, CERTIFIED REGISTERED

## 2020-01-01 PROCEDURE — 25010000002 EPINEPHRINE 1 MG/10ML SOLUTION PREFILLED SYRINGE: Performed by: STUDENT IN AN ORGANIZED HEALTH CARE EDUCATION/TRAINING PROGRAM

## 2020-01-01 PROCEDURE — U0003 INFECTIOUS AGENT DETECTION BY NUCLEIC ACID (DNA OR RNA); SEVERE ACUTE RESPIRATORY SYNDROME CORONAVIRUS 2 (SARS-COV-2) (CORONAVIRUS DISEASE [COVID-19]), AMPLIFIED PROBE TECHNIQUE, MAKING USE OF HIGH THROUGHPUT TECHNOLOGIES AS DESCRIBED BY CMS-2020-01-R: HCPCS | Performed by: INTERNAL MEDICINE

## 2020-01-01 PROCEDURE — 92950 HEART/LUNG RESUSCITATION CPR: CPT

## 2020-01-01 RX ORDER — PROPOFOL 10 MG/ML
VIAL (ML) INTRAVENOUS AS NEEDED
Status: DISCONTINUED | OUTPATIENT
Start: 2020-01-01 | End: 2020-01-01 | Stop reason: SURG

## 2020-01-01 RX ORDER — LIDOCAINE HYDROCHLORIDE 20 MG/ML
INJECTION, SOLUTION INTRAVENOUS AS NEEDED
Status: DISCONTINUED | OUTPATIENT
Start: 2020-01-01 | End: 2020-01-01 | Stop reason: SURG

## 2020-01-01 RX ORDER — DEXTROSE AND SODIUM CHLORIDE 5; .45 G/100ML; G/100ML
30 INJECTION, SOLUTION INTRAVENOUS CONTINUOUS PRN
Status: DISCONTINUED | OUTPATIENT
Start: 2020-01-01 | End: 2020-01-01 | Stop reason: HOSPADM

## 2020-01-01 RX ORDER — EPINEPHRINE 0.1 MG/ML
SYRINGE (ML) INJECTION
Status: COMPLETED | OUTPATIENT
Start: 2020-01-01 | End: 2020-01-01

## 2020-01-01 RX ORDER — CALCIUM CHLORIDE 100 MG/ML
INJECTION INTRAVENOUS; INTRAVENTRICULAR
Status: COMPLETED | OUTPATIENT
Start: 2020-01-01 | End: 2020-01-01

## 2020-01-01 RX ORDER — DEXTROSE MONOHYDRATE 25 G/50ML
INJECTION, SOLUTION INTRAVENOUS
Status: COMPLETED | OUTPATIENT
Start: 2020-01-01 | End: 2020-01-01

## 2020-01-01 RX ADMIN — PROPOFOL 30 MG: 10 INJECTION, EMULSION INTRAVENOUS at 13:30

## 2020-01-01 RX ADMIN — PROPOFOL 20 MG: 10 INJECTION, EMULSION INTRAVENOUS at 13:44

## 2020-01-01 RX ADMIN — DEXTROSE MONOHYDRATE 25 ML: 25 INJECTION, SOLUTION INTRAVENOUS at 18:26

## 2020-01-01 RX ADMIN — PROPOFOL 20 MG: 10 INJECTION, EMULSION INTRAVENOUS at 13:38

## 2020-01-01 RX ADMIN — EPINEPHRINE 1 MG: 0.1 INJECTION INTRACARDIAC; INTRAVENOUS at 18:27

## 2020-01-01 RX ADMIN — CALCIUM CHLORIDE 1 G: 100 INJECTION, SOLUTION INTRAVENOUS at 18:24

## 2020-01-01 RX ADMIN — PROPOFOL 30 MG: 10 INJECTION, EMULSION INTRAVENOUS at 13:27

## 2020-01-01 RX ADMIN — SODIUM BICARBONATE 50 MEQ: 84 INJECTION INTRAVENOUS at 18:23

## 2020-01-01 RX ADMIN — PROPOFOL 20 MG: 10 INJECTION, EMULSION INTRAVENOUS at 13:35

## 2020-01-01 RX ADMIN — LIDOCAINE HYDROCHLORIDE 50 MG: 20 INJECTION, SOLUTION INTRAVENOUS at 13:23

## 2020-01-01 RX ADMIN — DEXTROSE AND SODIUM CHLORIDE 30 ML/HR: 5; 450 INJECTION, SOLUTION INTRAVENOUS at 12:59

## 2020-01-01 RX ADMIN — PROPOFOL 100 MG: 10 INJECTION, EMULSION INTRAVENOUS at 13:23

## 2020-01-01 RX ADMIN — PROPOFOL 20 MG: 10 INJECTION, EMULSION INTRAVENOUS at 13:32

## 2020-01-01 RX ADMIN — PROPOFOL 20 MG: 10 INJECTION, EMULSION INTRAVENOUS at 13:41

## 2020-01-01 RX ADMIN — EPINEPHRINE 1 MG: 0.1 INJECTION INTRACARDIAC; INTRAVENOUS at 18:23

## 2020-07-30 NOTE — ANESTHESIA PREPROCEDURE EVALUATION
Anesthesia Evaluation     Patient summary reviewed and Nursing notes reviewed                Airway   Mallampati: II  No difficulty expected  Dental      Pulmonary - normal exam   (+) pneumonia resolved , a smoker Current,   Cardiovascular - negative cardio ROS    Rhythm: regular  Rate: normal        Neuro/Psych  (+) numbness,     GI/Hepatic/Renal/Endo - negative ROS     Musculoskeletal (-) negative ROS    Abdominal    Substance History      OB/GYN          Other - negative ROS                       Anesthesia Plan    ASA 2     MAC     intravenous induction     Anesthetic plan, all risks, benefits, and alternatives have been provided, discussed and informed consent has been obtained with: patient.    Plan discussed with CRNA.

## 2020-08-08 NOTE — ED PROVIDER NOTES
Subjective   Patient found down by family for unknown period of time.  He was down for a minimum of 20 minutes without CPR before family started compressions for about 10 minutes.  EMS continue compressions for an additional 30 minutes in route to the ER.      History provided by:  EMS personnel  Cardiac Arrest   Witnessed by:  Not witnessed  Incident location:  Home  Time before BLS initiated:  > 5 minutes  Time before ALS initiated:  > 10 minutes  Condition upon EMS arrival:  Unresponsive  Pulse:  Absent  Initial cardiac rhythm per EMS:  Asystole  Treatments prior to arrival:  ACLS protocol, intubation and vascular access  Medications given prior to ED:  Epinephrine (narcan)  IV access type:  Peripheral  Airway:  Intubation prior to arrival  Rhythm on admission to ED:  Asystole  Risk factors: no trauma        Review of Systems   Unable to perform ROS: Patient unresponsive       Past Medical History:   Diagnosis Date   • Corneal FB (foreign body)     improved   • Hearing loss     for 30 years problems hearing high pitche dsounds.   • Measles    • Pneumonia     2016 dec       No Known Allergies    Past Surgical History:   Procedure Laterality Date   • COLONOSCOPY N/A 7/30/2020    Procedure: COLONOSCOPY;  Surgeon: Sp Mukherjee DO;  Location: [facility] ENDOSCOPY;  Service: Gastroenterology;  Laterality: N/A;  tatoo at hepatic flexure polyp site   • EYE SURGERY  04/15/2016    remove foreign body cornea with slit lamp   • FOOT SURGERY Bilateral    • VASECTOMY      1993       Family History   Problem Relation Age of Onset   • Cancer Mother    • Cancer Father        Social History     Socioeconomic History   • Marital status:      Spouse name: Not on file   • Number of children: Not on file   • Years of education: Not on file   • Highest education level: Not on file   Tobacco Use   • Smoking status: Heavy Tobacco Smoker     Packs/day: 1.00     Types: Cigarettes   • Smokeless tobacco: Never Used   • Tobacco  comment: Smoking Cessation discussed   Substance and Sexual Activity   • Alcohol use: Yes     Comment: ocassionally   • Drug use: No   • Sexual activity: Defer           Objective    There were no vitals filed for this visit.    Physical Exam   Constitutional: He appears cachectic. He is intubated.   Eyes: Conjunctivae and lids are normal. Right pupil is not reactive. Right pupil is round. Left pupil is not reactive. Left pupil is round. Pupils are equal.   Neck: No tracheal deviation present.   Pulmonary/Chest: He is intubated. He has no decreased breath sounds.   Abdominal: Normal appearance.   Neurological: He is unresponsive. He displays no seizure activity. GCS eye subscore is 1. GCS verbal subscore is 1. GCS motor subscore is 1.   Nursing note and vitals reviewed.      Procedures           ED Course                                           MDM  Number of Diagnoses or Management Options  Cardiac arrest (CMS/HCC): new and requires workup  Unresponsive: new and requires workup  Diagnosis management comments: Patient placed in bed 6 and seen by me.  ACLS protocols initiated.  Patient was found to be in asystole rhythm initially.  Compressions continued.  Endotracheal tube with good chest rise and lung sounds. Patient received epi, bicarb, calcium, dextrose.  Patient remained in a non-life-sustaining rhythm of asystole during his time in the ER.  Patient was coded for a total of approximately 40 minutes combined between EMS and in our department.  No change in the patient's status was noted during that time.  Given the extended period of coding with no improvement, it was decided to cease all resuscitation efforts.      Final diagnoses:   Cardiac arrest (CMS/HCC)   Unresponsive            Jose Antonio Duenas MD  08/08/20 1928

## 2020-08-08 NOTE — ED NOTES
Patient presents to the ED by EMS, patient found by family unresponsive with a faint pulse. CPR started by family. EMS did CPR approximately 30 minutes prior to arrival to the ED. 2 doses of 1 mg IV epi were given by EMS prior to arrival. 2 mg of narcan were given IV by EMS prior to arrival as well. EMS reported the patient to be in asystole the entire duration of their care of the patient.      Ade Otto RN  08/08/20 [id number]

## (undated) DEVICE — TRAP SXN POLYP QUICKCATCH LF

## (undated) DEVICE — SINGLE USE INJECTOR: Brand: SINGLE USE INJECTOR

## (undated) DEVICE — MARKR SPOT ENDOSCOPIC LESION INJ

## (undated) DEVICE — CANN SMPL SOFTECH BIFLO ETCO2 A/M 7FT

## (undated) DEVICE — Device: Brand: DISPOSABLE ELECTROSURGICAL SNARE